# Patient Record
Sex: FEMALE | Race: WHITE | NOT HISPANIC OR LATINO | Employment: PART TIME | URBAN - METROPOLITAN AREA
[De-identification: names, ages, dates, MRNs, and addresses within clinical notes are randomized per-mention and may not be internally consistent; named-entity substitution may affect disease eponyms.]

---

## 2017-05-25 ENCOUNTER — ALLSCRIPTS OFFICE VISIT (OUTPATIENT)
Dept: OTHER | Facility: OTHER | Age: 32
End: 2017-05-25

## 2017-05-25 ENCOUNTER — HOSPITAL ENCOUNTER (EMERGENCY)
Facility: HOSPITAL | Age: 32
Discharge: HOME | End: 2017-05-25
Admitting: EMERGENCY MEDICINE
Payer: COMMERCIAL

## 2017-05-25 ENCOUNTER — APPOINTMENT (EMERGENCY)
Dept: RADIOLOGY | Facility: HOSPITAL | Age: 32
End: 2017-05-25
Payer: COMMERCIAL

## 2017-05-25 VITALS
RESPIRATION RATE: 20 BRPM | HEART RATE: 77 BPM | OXYGEN SATURATION: 99 % | HEIGHT: 64 IN | SYSTOLIC BLOOD PRESSURE: 162 MMHG | BODY MASS INDEX: 34.66 KG/M2 | WEIGHT: 203 LBS | TEMPERATURE: 98.4 F | DIASTOLIC BLOOD PRESSURE: 86 MMHG

## 2017-05-25 DIAGNOSIS — M54.9 DORSALGIA: ICD-10-CM

## 2017-05-25 DIAGNOSIS — M62.830 MUSCLE SPASM OF BACK: Primary | ICD-10-CM

## 2017-05-25 LAB
ALBUMIN SERPL BCP-MCNC: 3.7 G/DL (ref 3.5–5)
ALP SERPL-CCNC: 68 U/L (ref 46–116)
ALT SERPL W P-5'-P-CCNC: 24 U/L (ref 12–78)
ANION GAP SERPL CALCULATED.3IONS-SCNC: 11 MMOL/L (ref 4–13)
APTT PPP: 26 SECONDS (ref 24–33)
AST SERPL W P-5'-P-CCNC: 5 U/L (ref 5–45)
BASOPHILS # BLD AUTO: 0 THOUSANDS/ΜL (ref 0–0.1)
BASOPHILS NFR BLD AUTO: 0 % (ref 0–1)
BILIRUB SERPL-MCNC: 0.2 MG/DL (ref 0.2–1)
BILIRUB UR QL STRIP: NORMAL
BUN SERPL-MCNC: 8 MG/DL (ref 5–25)
CALCIUM SERPL-MCNC: 8.8 MG/DL (ref 8.3–10.1)
CHLORIDE SERPL-SCNC: 105 MMOL/L (ref 100–108)
CLARITY UR: NORMAL
CLARITY, POC: CLEAR
CO2 SERPL-SCNC: 26 MMOL/L (ref 21–32)
COLOR UR: YELLOW
COLOR, POC: YELLOW
CREAT SERPL-MCNC: 0.77 MG/DL (ref 0.6–1.3)
EOSINOPHIL # BLD AUTO: 0.3 THOUSAND/ΜL (ref 0–0.61)
EOSINOPHIL NFR BLD AUTO: 3 % (ref 0–6)
ERYTHROCYTE [DISTWIDTH] IN BLOOD BY AUTOMATED COUNT: 15.4 % (ref 11.6–15.1)
EXT BLOOD URINE: NORMAL
EXT GLUCOSE, UA: NORMAL
EXT KETONES: NORMAL
EXT NITRITE, UA: NORMAL
EXT PH, UA: 6.5
EXT PROTEIN, UA: NORMAL
GFR SERPL CREATININE-BSD FRML MDRD: >60 ML/MIN/1.73SQ M
GLUCOSE (HISTORICAL): NORMAL
GLUCOSE SERPL-MCNC: 125 MG/DL (ref 65–140)
HCG UR QL: NORMAL
HCT VFR BLD AUTO: 40.7 % (ref 37–47)
HGB BLD-MCNC: 13.1 G/DL (ref 12–16)
HGB UR QL STRIP.AUTO: 250
INR PPP: 0.96 (ref 0.86–1.16)
KETONES UR STRIP-MCNC: NORMAL MG/DL
LEUKOCYTE ESTERASE UR QL STRIP: NORMAL
LYMPHOCYTES # BLD AUTO: 1.8 THOUSANDS/ΜL (ref 0.6–4.47)
LYMPHOCYTES NFR BLD AUTO: 19 % (ref 14–44)
MCH RBC QN AUTO: 28 PG (ref 27–31)
MCHC RBC AUTO-ENTMCNC: 32.3 G/DL (ref 31.4–37.4)
MCV RBC AUTO: 87 FL (ref 82–98)
MONOCYTES # BLD AUTO: 0.5 THOUSAND/ΜL (ref 0.17–1.22)
MONOCYTES NFR BLD AUTO: 6 % (ref 4–12)
NEUTROPHILS # BLD AUTO: 7 THOUSANDS/ΜL (ref 1.85–7.62)
NEUTS SEG NFR BLD AUTO: 72 % (ref 43–75)
NITRITE UR QL STRIP: NORMAL
NRBC BLD AUTO-RTO: 0 /100 WBCS
PH UR STRIP.AUTO: 7 [PH]
PLATELET # BLD AUTO: 345 THOUSANDS/UL (ref 130–400)
PMV BLD AUTO: 8.2 FL (ref 8.9–12.7)
POTASSIUM SERPL-SCNC: 3.3 MMOL/L (ref 3.5–5.3)
PROT SERPL-MCNC: 7.2 G/DL (ref 6.4–8.2)
PROT UR STRIP-MCNC: NORMAL MG/DL
PROTHROMBIN TIME: 10.1 SECONDS (ref 9.4–11.7)
RBC # BLD AUTO: 4.7 MILLION/UL (ref 4.2–5.4)
SODIUM SERPL-SCNC: 142 MMOL/L (ref 136–145)
SP GR UR STRIP.AUTO: 1.01
UROBILINOGEN UR QL STRIP.AUTO: 0
WBC # BLD AUTO: 9.7 THOUSAND/UL (ref 4.8–10.8)
WBC # BLD EST: NORMAL 10*3/UL

## 2017-05-25 PROCEDURE — 80053 COMPREHEN METABOLIC PANEL: CPT | Performed by: PHYSICIAN ASSISTANT

## 2017-05-25 PROCEDURE — 85610 PROTHROMBIN TIME: CPT | Performed by: PHYSICIAN ASSISTANT

## 2017-05-25 PROCEDURE — 81025 URINE PREGNANCY TEST: CPT | Performed by: PHYSICIAN ASSISTANT

## 2017-05-25 PROCEDURE — 96374 THER/PROPH/DIAG INJ IV PUSH: CPT

## 2017-05-25 PROCEDURE — 74176 CT ABD & PELVIS W/O CONTRAST: CPT

## 2017-05-25 PROCEDURE — 85730 THROMBOPLASTIN TIME PARTIAL: CPT | Performed by: PHYSICIAN ASSISTANT

## 2017-05-25 PROCEDURE — 99284 EMERGENCY DEPT VISIT MOD MDM: CPT

## 2017-05-25 PROCEDURE — 96375 TX/PRO/DX INJ NEW DRUG ADDON: CPT

## 2017-05-25 PROCEDURE — 36415 COLL VENOUS BLD VENIPUNCTURE: CPT | Performed by: PHYSICIAN ASSISTANT

## 2017-05-25 PROCEDURE — 85025 COMPLETE CBC W/AUTO DIFF WBC: CPT | Performed by: PHYSICIAN ASSISTANT

## 2017-05-25 PROCEDURE — 81002 URINALYSIS NONAUTO W/O SCOPE: CPT | Performed by: PHYSICIAN ASSISTANT

## 2017-05-25 PROCEDURE — 96361 HYDRATE IV INFUSION ADD-ON: CPT

## 2017-05-25 RX ORDER — MORPHINE SULFATE 4 MG/ML
4 INJECTION, SOLUTION INTRAMUSCULAR; INTRAVENOUS ONCE
Status: COMPLETED | OUTPATIENT
Start: 2017-05-25 | End: 2017-05-25

## 2017-05-25 RX ORDER — NAPROXEN 500 MG/1
500 TABLET ORAL 2 TIMES DAILY WITH MEALS
Qty: 20 TABLET | Refills: 0 | Status: SHIPPED | OUTPATIENT
Start: 2017-05-25 | End: 2018-02-04

## 2017-05-25 RX ORDER — CYCLOBENZAPRINE HCL 10 MG
10 TABLET ORAL 3 TIMES DAILY PRN
Qty: 12 TABLET | Refills: 0 | Status: SHIPPED | OUTPATIENT
Start: 2017-05-25 | End: 2018-02-04

## 2017-05-25 RX ORDER — ONDANSETRON 2 MG/ML
4 INJECTION INTRAMUSCULAR; INTRAVENOUS ONCE
Status: COMPLETED | OUTPATIENT
Start: 2017-05-25 | End: 2017-05-25

## 2017-05-25 RX ADMIN — ONDANSETRON 4 MG: 2 INJECTION INTRAMUSCULAR; INTRAVENOUS at 17:02

## 2017-05-25 RX ADMIN — MORPHINE SULFATE 4 MG: 4 INJECTION, SOLUTION INTRAMUSCULAR; INTRAVENOUS at 17:04

## 2017-05-25 RX ADMIN — SODIUM CHLORIDE 1000 ML: 0.9 INJECTION, SOLUTION INTRAVENOUS at 16:58

## 2017-05-26 ENCOUNTER — GENERIC CONVERSION - ENCOUNTER (OUTPATIENT)
Dept: OTHER | Facility: OTHER | Age: 32
End: 2017-05-26

## 2017-05-27 LAB
BACTERIA UR QL AUTO: ABNORMAL
BILIRUB UR QL STRIP: NEGATIVE
COLOR UR: YELLOW
COMMENT (HISTORICAL): CLEAR
CRYSTAL TYPE (HISTORICAL): ABNORMAL
CRYSTALS URNS QL MICRO: PRESENT
FECAL OCCULT BLOOD DIAGNOSTIC (HISTORICAL): NEGATIVE
GLUCOSE (HISTORICAL): NEGATIVE
KETONES UR STRIP-MCNC: NEGATIVE MG/DL
LEUKOCYTE ESTERASE UR QL STRIP: NEGATIVE
MICROSCOPIC EXAMINATION (HISTORICAL): ABNORMAL
MICROSCOPIC EXAMINATION (HISTORICAL): ABNORMAL
MUCUS THREADS (HISTORICAL): PRESENT
NITRITE UR QL STRIP: NEGATIVE
NON-SQ EPI CELLS URNS QL MICRO: ABNORMAL /HPF
PH UR STRIP.AUTO: 6.5 [PH] (ref 5–7.5)
PROT UR STRIP-MCNC: NEGATIVE MG/DL
RBC (HISTORICAL): ABNORMAL /HPF
SP GR UR STRIP.AUTO: 1.02 (ref 1–1.03)
UROBILINOGEN UR QL STRIP.AUTO: 0.2 EU/DL (ref 0.2–1)
WBC # BLD AUTO: ABNORMAL /HPF

## 2017-05-28 LAB
CULTURE RESULT (HISTORICAL): NORMAL
MISCELLANEOUS LAB TEST RESULT (HISTORICAL): NORMAL

## 2017-06-06 ENCOUNTER — ALLSCRIPTS OFFICE VISIT (OUTPATIENT)
Dept: OTHER | Facility: OTHER | Age: 32
End: 2017-06-06

## 2017-09-11 ENCOUNTER — GENERIC CONVERSION - ENCOUNTER (OUTPATIENT)
Dept: OTHER | Facility: OTHER | Age: 32
End: 2017-09-11

## 2017-09-11 DIAGNOSIS — N63.0 BREAST LUMP: ICD-10-CM

## 2017-09-18 ENCOUNTER — HOSPITAL ENCOUNTER (OUTPATIENT)
Dept: RADIOLOGY | Facility: HOSPITAL | Age: 32
Discharge: HOME | End: 2017-09-18
Payer: COMMERCIAL

## 2017-09-18 DIAGNOSIS — N63.0 BREAST LUMP: ICD-10-CM

## 2017-09-18 PROCEDURE — G0204 DX MAMMO INCL CAD BI: HCPCS

## 2017-09-18 PROCEDURE — 76642 ULTRASOUND BREAST LIMITED: CPT

## 2017-09-19 ENCOUNTER — GENERIC CONVERSION - ENCOUNTER (OUTPATIENT)
Dept: OTHER | Facility: OTHER | Age: 32
End: 2017-09-19

## 2018-01-13 VITALS
TEMPERATURE: 97.2 F | WEIGHT: 202 LBS | HEART RATE: 76 BPM | HEIGHT: 64 IN | RESPIRATION RATE: 16 BRPM | DIASTOLIC BLOOD PRESSURE: 80 MMHG | SYSTOLIC BLOOD PRESSURE: 152 MMHG | OXYGEN SATURATION: 98 % | BODY MASS INDEX: 34.49 KG/M2

## 2018-01-13 VITALS
RESPIRATION RATE: 18 BRPM | TEMPERATURE: 97.8 F | OXYGEN SATURATION: 98 % | HEART RATE: 88 BPM | DIASTOLIC BLOOD PRESSURE: 80 MMHG | BODY MASS INDEX: 35.02 KG/M2 | WEIGHT: 204 LBS | SYSTOLIC BLOOD PRESSURE: 138 MMHG

## 2018-01-13 NOTE — MISCELLANEOUS
Chief Complaint  Chief Complaint Free Text Note Form: Phone call to patient 8/23/16 at 4:39 pm  No answer, message left to call back to office  Follow up appointment scheduled 8/31/16 at 7 pm with Dr Allen Moyer RN      Active Problems    1  Abnormal weight gain (783 1) (R63 5)   2  BMI 36 0-36 9,adult (V85 36) (Z68 36)   3  Encounter for cervical Pap smear with pelvic exam (V76 2,V72 31) (Z01 419)   4  Encounter for gynecological examination without abnormal finding (V72 31) (Z01 419)   5  Encounter for screening for cardiovascular disorders (V81 2) (Z13 6)   6  Kidney stones (592 0) (N20 0)   7  Screen for STD (sexually transmitted disease) (V74 5) (Z11 3)   8  Skin tags, multiple acquired (701 9) (L91 8)    Past Medical History    1  History of kidney stones (V13 01) (R80 364)    Surgical History    1  History of Cholecystectomy    Family History  Mother    1  No pertinent family history    Social History    · Former smoker (A02 39) (V68 652)   · Social drinker    Allergies    1  No Known Drug Allergies    2  No Known Environmental Allergies   3  No Known Food Allergies   4   No Known Latex Allergies    Message   Recorded as Task   Date: 08/20/2016 04:03 PM, Created By: System   Task Name: Michel Del Angel   Assigned To: Isabella Gardner   Regarding Patient: Jason Bruno, Status: Active   Comment:    System - 20 Aug 2016 4:03 PM     Patient discharged from hospital   Patient Name: Todd Roblero  Patient YOB: 1985  Discharge Date: 8/20/2016  Facility: [facility] - 22 Aug 2016 8:00 AM     TASK EDITED     Signatures   Electronically signed by : WILLIAMS Florence ; Oct  3 2016  3:10PM EST                       (Author)    Electronically signed by : KIET De Paz ; Oct  3 2016  3:20PM EST                       (Author)

## 2018-01-18 NOTE — MISCELLANEOUS
Message  tried to reach pt multiple times by phone number in chart  no VM avail for message  sent letter us mail/certified   scanned into chart      Signatures   Electronically signed by : ARIELLA Anthony; Sep 19 2017  2:02PM EST                       (Author)

## 2018-01-22 VITALS
HEART RATE: 68 BPM | OXYGEN SATURATION: 98 % | HEIGHT: 64 IN | SYSTOLIC BLOOD PRESSURE: 122 MMHG | BODY MASS INDEX: 36.02 KG/M2 | RESPIRATION RATE: 16 BRPM | WEIGHT: 211 LBS | DIASTOLIC BLOOD PRESSURE: 72 MMHG | TEMPERATURE: 97.1 F

## 2018-02-04 ENCOUNTER — APPOINTMENT (EMERGENCY)
Dept: RADIOLOGY | Facility: HOSPITAL | Age: 33
End: 2018-02-04
Payer: COMMERCIAL

## 2018-02-04 ENCOUNTER — HOSPITAL ENCOUNTER (EMERGENCY)
Facility: HOSPITAL | Age: 33
Discharge: HOME | End: 2018-02-04
Attending: EMERGENCY MEDICINE | Admitting: EMERGENCY MEDICINE
Payer: COMMERCIAL

## 2018-02-04 VITALS
RESPIRATION RATE: 20 BRPM | TEMPERATURE: 97.7 F | HEART RATE: 83 BPM | OXYGEN SATURATION: 97 % | DIASTOLIC BLOOD PRESSURE: 62 MMHG | WEIGHT: 200 LBS | HEIGHT: 64 IN | SYSTOLIC BLOOD PRESSURE: 125 MMHG | BODY MASS INDEX: 34.15 KG/M2

## 2018-02-04 DIAGNOSIS — R10.9 FLANK PAIN: Primary | ICD-10-CM

## 2018-02-04 LAB
ANION GAP SERPL CALCULATED.3IONS-SCNC: 8 MMOL/L (ref 4–13)
BASOPHILS # BLD AUTO: 0 THOUSANDS/ΜL (ref 0–0.1)
BASOPHILS NFR BLD AUTO: 0 % (ref 0–1)
BILIRUB UR QL STRIP: NEGATIVE
BUN SERPL-MCNC: 11 MG/DL (ref 5–25)
CALCIUM SERPL-MCNC: 9.1 MG/DL (ref 8.3–10.1)
CHLORIDE SERPL-SCNC: 105 MMOL/L (ref 100–108)
CLARITY UR: CLEAR
CO2 SERPL-SCNC: 30 MMOL/L (ref 21–32)
COLOR UR: YELLOW
CREAT SERPL-MCNC: 0.68 MG/DL (ref 0.6–1.3)
EOSINOPHIL # BLD AUTO: 0.4 THOUSAND/ΜL (ref 0–0.61)
EOSINOPHIL NFR BLD AUTO: 4 % (ref 0–6)
ERYTHROCYTE [DISTWIDTH] IN BLOOD BY AUTOMATED COUNT: 15 % (ref 11.6–15.1)
EXT PREG TEST URINE: NEGATIVE
GFR SERPL CREATININE-BSD FRML MDRD: 114 ML/MIN/1.73SQ M
GLUCOSE SERPL-MCNC: 127 MG/DL (ref 65–140)
GLUCOSE UR STRIP-MCNC: NEGATIVE MG/DL
HCT VFR BLD AUTO: 41.1 % (ref 37–47)
HGB BLD-MCNC: 13.5 G/DL (ref 12–16)
HGB UR QL STRIP.AUTO: NEGATIVE
KETONES UR STRIP-MCNC: NEGATIVE MG/DL
LEUKOCYTE ESTERASE UR QL STRIP: NEGATIVE
LYMPHOCYTES # BLD AUTO: 2.2 THOUSANDS/ΜL (ref 0.6–4.47)
LYMPHOCYTES NFR BLD AUTO: 23 % (ref 14–44)
MCH RBC QN AUTO: 29.3 PG (ref 27–31)
MCHC RBC AUTO-ENTMCNC: 32.9 G/DL (ref 31.4–37.4)
MCV RBC AUTO: 89 FL (ref 82–98)
MONOCYTES # BLD AUTO: 0.7 THOUSAND/ΜL (ref 0.17–1.22)
MONOCYTES NFR BLD AUTO: 7 % (ref 4–12)
NEUTROPHILS # BLD AUTO: 6.2 THOUSANDS/ΜL (ref 1.85–7.62)
NEUTS SEG NFR BLD AUTO: 65 % (ref 43–75)
NITRITE UR QL STRIP: NEGATIVE
NRBC BLD AUTO-RTO: 0 /100 WBCS
PH UR STRIP.AUTO: 6.5 [PH] (ref 5–9)
PLATELET # BLD AUTO: 318 THOUSANDS/UL (ref 130–400)
PMV BLD AUTO: 8.3 FL (ref 8.9–12.7)
POTASSIUM SERPL-SCNC: 3.3 MMOL/L (ref 3.5–5.3)
PROT UR STRIP-MCNC: NEGATIVE MG/DL
RBC # BLD AUTO: 4.62 MILLION/UL (ref 4.2–5.4)
SODIUM SERPL-SCNC: 143 MMOL/L (ref 136–145)
SP GR UR STRIP.AUTO: 1.02 (ref 1–1.03)
UROBILINOGEN UR QL STRIP.AUTO: 0.2 E.U./DL
WBC # BLD AUTO: 9.6 THOUSAND/UL (ref 4.8–10.8)

## 2018-02-04 PROCEDURE — 36415 COLL VENOUS BLD VENIPUNCTURE: CPT | Performed by: EMERGENCY MEDICINE

## 2018-02-04 PROCEDURE — 96361 HYDRATE IV INFUSION ADD-ON: CPT

## 2018-02-04 PROCEDURE — 99284 EMERGENCY DEPT VISIT MOD MDM: CPT

## 2018-02-04 PROCEDURE — 81003 URINALYSIS AUTO W/O SCOPE: CPT | Performed by: EMERGENCY MEDICINE

## 2018-02-04 PROCEDURE — 96374 THER/PROPH/DIAG INJ IV PUSH: CPT

## 2018-02-04 PROCEDURE — 96375 TX/PRO/DX INJ NEW DRUG ADDON: CPT

## 2018-02-04 PROCEDURE — 74176 CT ABD & PELVIS W/O CONTRAST: CPT

## 2018-02-04 PROCEDURE — 85025 COMPLETE CBC W/AUTO DIFF WBC: CPT | Performed by: EMERGENCY MEDICINE

## 2018-02-04 PROCEDURE — 80048 BASIC METABOLIC PNL TOTAL CA: CPT | Performed by: EMERGENCY MEDICINE

## 2018-02-04 PROCEDURE — 81025 URINE PREGNANCY TEST: CPT | Performed by: EMERGENCY MEDICINE

## 2018-02-04 RX ORDER — KETOROLAC TROMETHAMINE 30 MG/ML
30 INJECTION, SOLUTION INTRAMUSCULAR; INTRAVENOUS ONCE
Status: COMPLETED | OUTPATIENT
Start: 2018-02-04 | End: 2018-02-04

## 2018-02-04 RX ORDER — MORPHINE SULFATE 4 MG/ML
4 INJECTION, SOLUTION INTRAMUSCULAR; INTRAVENOUS ONCE
Status: COMPLETED | OUTPATIENT
Start: 2018-02-04 | End: 2018-02-04

## 2018-02-04 RX ORDER — ONDANSETRON 2 MG/ML
4 INJECTION INTRAMUSCULAR; INTRAVENOUS ONCE
Status: COMPLETED | OUTPATIENT
Start: 2018-02-04 | End: 2018-02-04

## 2018-02-04 RX ADMIN — SODIUM CHLORIDE 1000 ML: 0.9 INJECTION, SOLUTION INTRAVENOUS at 22:00

## 2018-02-04 RX ADMIN — MORPHINE SULFATE 4 MG: 4 INJECTION, SOLUTION INTRAMUSCULAR; INTRAVENOUS at 22:30

## 2018-02-04 RX ADMIN — KETOROLAC TROMETHAMINE 30 MG: 30 INJECTION, SOLUTION INTRAMUSCULAR at 22:01

## 2018-02-04 RX ADMIN — ONDANSETRON 4 MG: 2 INJECTION INTRAMUSCULAR; INTRAVENOUS at 22:02

## 2018-02-05 NOTE — DISCHARGE INSTRUCTIONS
Renal Colic   WHAT YOU NEED TO KNOW:   Renal colic is severe pain in your lower back or sides  The pain is usually on one side, but may be on both sides of your lower back  Renal colic may start quickly, come and go, and become worse over time  Renal colic is caused by a blockage in your urinary tract  The most common cause of a blockage is a kidney stone  Blood clots, ureter spasms, and dead tissue may also block your urinary tract  DISCHARGE INSTRUCTIONS:   Return to the emergency department if:   · You cannot stop vomiting  · You see new or increased bleeding when you urinate  · You are urinating less than usual, or not at all  · Your pain is not getting better even after treatment  Contact your healthcare provider if:   · You have fever  · You need to urinate more often than usual, or right away  · You see a stone in your urine strainer after you urinate  · You have questions or concerns about your condition or care  Medicines:   · Medicines  may help decrease pain and muscle spasms  You may also need medicine to calm your stomach and stop vomiting  · Take your medicine as directed  Contact your healthcare provider if you think your medicine is not helping or if you have side effects  Tell him of her if you are allergic to any medicine  Keep a list of the medicines, vitamins, and herbs you take  Include the amounts, and when and why you take them  Bring the list or the pill bottles to follow-up visits  Carry your medicine list with you in case of an emergency  Manage your symptoms:   · Drink liquids as directed  to help decrease pain and flush blockages from your urinary tract  Ask how much liquid to drink each day and which liquids are best for you  You may need to drink about 3 liters (12 glasses) of liquids each day  Half of your total daily liquids should be water  Limit coffee, tea, and soda to 2 cups daily  Your urine should be pale and clear      · Strain your urine every time you urinate  Urinate into a strainer (funnel with a fine mesh on the bottom) or glass jar to collect kidney stones  Give the kidney stones to your healthcare provider at your next visit  · Eat a variety of healthy foods  Healthy foods include fruits, vegetables, whole-grain breads, low-fat dairy products, beans, lean meats, and fish  You may need to increase the amount of citrus fruit you eat, such as oranges  Ask your healthcare provider how much salt, calcium, and protein you should eat  · Avoid activity in hot temperatures  Heat may cause you to become dehydrated and urinate less  Follow up with your healthcare provider as directed: You may need to return for tests to check if your blockage has cleared  Write down your questions so you remember to ask them during your visits  © 2017 [employer] Information is for End User's use only and may not be sold, redistributed or otherwise used for commercial purposes  All illustrations and images included in CareNotes® are the copyrighted property of [employer]  or [employer]ckenna  The above information is an  only  It is not intended as medical advice for individual conditions or treatments  Talk to your doctor, nurse or pharmacist before following any medical regimen to see if it is safe and effective for you

## 2018-02-05 NOTE — ED PROVIDER NOTES
History  Chief Complaint   Patient presents with    Flank Pain     States pain right flank for about 1 week  Hx of kidney stones  Had stent removed 1 year ago   HPI  26 yo female with hx of renal colic, stents presents c/o right flank pain time one week  Pt states pain startdon right and has gotten progressively worse  Pt c/o nasuea and vomiting , no urinary symoptms  No hematuria   Pt denies any chest apin or shornetss of breath   Vitals:    18 2149   BP: 125/62   Pulse: 83   Resp: 20   Temp: 97 7 °F (36 5 °C)   SpO2: 97%     Breast Cancer-related family history is not on file  Social History     Social History    Marital status: Significant Other     Spouse name: N/A    Number of children: N/A    Years of education: N/A     Occupational History    Not on file  Social History Main Topics    Smoking status: Former Smoker     Packs/day: 0 25     Years: 15 00     Quit date:     Smokeless tobacco: Never Used    Alcohol use Yes      Comment: rarely    Drug use: No    Sexual activity: Yes     Partners: Male     Other Topics Concern    Not on file     Social History Narrative    No narrative on file     Past Medical History:   Diagnosis Date    Gallstones     Hypertension     With pregnancy    Kidney stones        None       Past Medical History:   Diagnosis Date    Gallstones     Hypertension     With pregnancy    Kidney stones        Past Surgical History:   Procedure Laterality Date     SECTION       &     CHOLECYSTECTOMY  2013    lap    EXTRACORPOREAL SHOCK WAVE LITHOTRIPSY Right 12/10/2010    15mm stone    EXTRACORPOREAL SHOCK WAVE LITHOTRIPSY Right 2011    5mm stone  Did not have f/u x-ray after [facility]  Sent reminder and slip  Negligent patient      INDUCED   2006    LA CYSTO/URETERO W/LITHOTRIPSY &INDWELL STENT INSRT Right 2016    Procedure: CYSTOSCOPY, RETROGRADE PYELOGRAM,URETEROSCOPY WITH HOLMIUM LASER LITHOTRIPSY,STONE BASKET EXTRACTION,  AND INSERTION URETERAL STENT;  Surgeon: Aurea Peterson MD;  Location: [address] [place] [address];  Service: Urology    [place] CYSTOURETHROSCOPY,URETER CATHETER Right 8/13/2016    Procedure: CYSTOSCOPY RETROGRADE PYELOGRAM WITH INSERTION STENT URETERAL;  Surgeon: Aurea Peterson MD;  Location: [address] [place] [address];  Service: Urology    TUBAL LIGATION  2010    URETERAL STENT PLACEMENT Right 06/30/2010    Pregnant    URETERAL STENT PLACEMENT Right 09/17/2010    Replaced still pregnant    URETERAL STENT PLACEMENT Right 11/05/2010    Still Pregnant       Family History   Problem Relation Age of Onset    Cancer Mother     Kidney disease Mother     Cancer Maternal Grandmother     Diabetes Maternal Grandmother     Kidney disease Maternal Grandmother      I have reviewed and agree with the history as documented  Social History   Substance Use Topics    Smoking status: Former Smoker     Packs/day: 0 25     Years: 15 00     Quit date: 2015    Smokeless tobacco: Never Used    Alcohol use Yes      Comment: rarely        Review of Systems   Constitutional: Negative  HENT: Negative  Eyes: Negative  Respiratory: Negative  Cardiovascular: Negative  Gastrointestinal: Negative  Endocrine: Negative  Genitourinary:        Right cva tenderness to palpation      Musculoskeletal: Negative  Skin: Negative  Allergic/Immunologic: Negative  Neurological: Negative  Hematological: Negative  Psychiatric/Behavioral: Negative  All other systems reviewed and are negative        Physical Exam  ED Triage Vitals [02/04/18 2149]   Temperature Pulse Respirations Blood Pressure SpO2   97 7 °F (36 5 °C) 83 20 125/62 97 %      Temp Source Heart Rate Source Patient Position - Orthostatic VS BP Location FiO2 (%)   Oral Monitor Sitting Left arm --      Pain Score       9           Orthostatic Vital Signs  Vitals:    02/04/18 2149   BP: 125/62   Pulse: 83   Patient Position - Orthostatic VS: Sitting       Physical Exam    ED Medications  Medications   sodium chloride 0 9 % bolus 1,000 mL (1,000 mL Intravenous New Bag 2/4/18 2200)   ketorolac (TORADOL) injection 30 mg (30 mg Intravenous Given 2/4/18 2201)   ondansetron (ZOFRAN) injection 4 mg (4 mg Intravenous Given 2/4/18 2202)       Diagnostic Studies  Results Reviewed     Procedure Component Value Units Date/Time    CBC and differential [[id number]] Collected:  02/04/18 2203    Lab Status: In process Specimen:  Blood from Arm, Right Updated:  02/04/18 5909    Basic metabolic panel [[id number]] Collected:  02/04/18 2203    Lab Status: In process Specimen:  Blood from Arm, Right Updated:  02/04/18 2208    UA w Reflex to Microscopic w Reflex to Culture [[id number]]     Lab Status:  No result Specimen:  Urine     POCT pregnancy, urine [[id number]]     Lab Status:  No result                  CT renal stone study abdomen pelvis wo contrast    (Results Pending)              Procedures  Procedures       Phone Contacts  ED Phone Contact    ED Course  ED Course          A/P 28 yo female with hx of renal colic, pt c/o increasing right flank pain - r/o renal stone, r/o hydronephrosis   -labs  -ct abdomen/pelvis renal   -ivf  -toradol          11:26 PM  Ct - no evidence of significant acute process   nonobstruction right renl ston , no evidenc eo obstructive uropatyh   11:28 PM  Pt pending urinalysis                   [facility]  CritCare Time    Disposition  Final diagnoses:   None     ED Disposition     None      Follow-up Information    None       Patient's Medications   Discharge Prescriptions    No medications on file     No discharge procedures on file      ED Provider  Electronically Signed by           Kassie Frausto MD  02/04/18 0001       Kassie Frausto MD  02/04/18 7831       Kassie Frausto MD  02/04/18 5150

## 2018-08-24 ENCOUNTER — APPOINTMENT (EMERGENCY)
Dept: RADIOLOGY | Facility: HOSPITAL | Age: 33
End: 2018-08-24
Payer: COMMERCIAL

## 2018-08-24 ENCOUNTER — HOSPITAL ENCOUNTER (EMERGENCY)
Facility: HOSPITAL | Age: 33
Discharge: HOME | End: 2018-08-25
Attending: EMERGENCY MEDICINE | Admitting: EMERGENCY MEDICINE
Payer: COMMERCIAL

## 2018-08-24 VITALS
TEMPERATURE: 98.5 F | RESPIRATION RATE: 18 BRPM | WEIGHT: 200 LBS | OXYGEN SATURATION: 97 % | BODY MASS INDEX: 34.06 KG/M2 | DIASTOLIC BLOOD PRESSURE: 88 MMHG | HEART RATE: 83 BPM | SYSTOLIC BLOOD PRESSURE: 150 MMHG

## 2018-08-24 DIAGNOSIS — R07.9 CHEST PAIN: Primary | ICD-10-CM

## 2018-08-24 LAB
ALBUMIN SERPL BCP-MCNC: 3.7 G/DL (ref 3.5–5)
ALP SERPL-CCNC: 61 U/L (ref 46–116)
ALT SERPL W P-5'-P-CCNC: 37 U/L (ref 12–78)
ANION GAP SERPL CALCULATED.3IONS-SCNC: 6 MMOL/L (ref 4–13)
APTT PPP: 25 SECONDS (ref 24–36)
AST SERPL W P-5'-P-CCNC: 12 U/L (ref 5–45)
BASOPHILS # BLD AUTO: 0.03 THOUSANDS/ΜL (ref 0–0.1)
BASOPHILS NFR BLD AUTO: 0 % (ref 0–1)
BILIRUB SERPL-MCNC: 0.3 MG/DL (ref 0.2–1)
BUN SERPL-MCNC: 9 MG/DL (ref 5–25)
CALCIUM SERPL-MCNC: 8.5 MG/DL (ref 8.3–10.1)
CHLORIDE SERPL-SCNC: 104 MMOL/L (ref 100–108)
CO2 SERPL-SCNC: 30 MMOL/L (ref 21–32)
CREAT SERPL-MCNC: 0.87 MG/DL (ref 0.6–1.3)
DEPRECATED D DIMER PPP: 360 NG/ML (FEU) (ref 190–520)
EOSINOPHIL # BLD AUTO: 0.16 THOUSAND/ΜL (ref 0–0.61)
EOSINOPHIL NFR BLD AUTO: 2 % (ref 0–6)
ERYTHROCYTE [DISTWIDTH] IN BLOOD BY AUTOMATED COUNT: 14.1 % (ref 11.6–15.1)
GFR SERPL CREATININE-BSD FRML MDRD: 87 ML/MIN/1.73SQ M
GLUCOSE SERPL-MCNC: 120 MG/DL (ref 65–140)
HCT VFR BLD AUTO: 37 % (ref 34.8–46.1)
HGB BLD-MCNC: 11.7 G/DL (ref 11.5–15.4)
IMM GRANULOCYTES # BLD AUTO: 0.02 THOUSAND/UL (ref 0–0.2)
IMM GRANULOCYTES NFR BLD AUTO: 0 % (ref 0–2)
INR PPP: 1 (ref 0.86–1.16)
LYMPHOCYTES # BLD AUTO: 1.92 THOUSANDS/ΜL (ref 0.6–4.47)
LYMPHOCYTES NFR BLD AUTO: 19 % (ref 14–44)
MCH RBC QN AUTO: 27.3 PG (ref 26.8–34.3)
MCHC RBC AUTO-ENTMCNC: 31.6 G/DL (ref 31.4–37.4)
MCV RBC AUTO: 86 FL (ref 82–98)
MONOCYTES # BLD AUTO: 0.66 THOUSAND/ΜL (ref 0.17–1.22)
MONOCYTES NFR BLD AUTO: 7 % (ref 4–12)
NEUTROPHILS # BLD AUTO: 7.22 THOUSANDS/ΜL (ref 1.85–7.62)
NEUTS SEG NFR BLD AUTO: 72 % (ref 43–75)
NRBC BLD AUTO-RTO: 0 /100 WBCS
PLATELET # BLD AUTO: 375 THOUSANDS/UL (ref 149–390)
PMV BLD AUTO: 9.7 FL (ref 8.9–12.7)
POTASSIUM SERPL-SCNC: 3.1 MMOL/L (ref 3.5–5.3)
PROT SERPL-MCNC: 7.2 G/DL (ref 6.4–8.2)
PROTHROMBIN TIME: 10.5 SECONDS (ref 9.4–11.7)
RBC # BLD AUTO: 4.29 MILLION/UL (ref 3.81–5.12)
SODIUM SERPL-SCNC: 140 MMOL/L (ref 136–145)
TROPONIN I SERPL-MCNC: <0.02 NG/ML
WBC # BLD AUTO: 10.01 THOUSAND/UL (ref 4.31–10.16)

## 2018-08-24 PROCEDURE — 93005 ELECTROCARDIOGRAM TRACING: CPT

## 2018-08-24 PROCEDURE — 85025 COMPLETE CBC W/AUTO DIFF WBC: CPT | Performed by: EMERGENCY MEDICINE

## 2018-08-24 PROCEDURE — 84484 ASSAY OF TROPONIN QUANT: CPT | Performed by: EMERGENCY MEDICINE

## 2018-08-24 PROCEDURE — 36415 COLL VENOUS BLD VENIPUNCTURE: CPT | Performed by: EMERGENCY MEDICINE

## 2018-08-24 PROCEDURE — 71045 X-RAY EXAM CHEST 1 VIEW: CPT

## 2018-08-24 PROCEDURE — 96374 THER/PROPH/DIAG INJ IV PUSH: CPT

## 2018-08-24 PROCEDURE — 85730 THROMBOPLASTIN TIME PARTIAL: CPT | Performed by: EMERGENCY MEDICINE

## 2018-08-24 PROCEDURE — 85610 PROTHROMBIN TIME: CPT | Performed by: EMERGENCY MEDICINE

## 2018-08-24 PROCEDURE — 85379 FIBRIN DEGRADATION QUANT: CPT | Performed by: EMERGENCY MEDICINE

## 2018-08-24 PROCEDURE — 80053 COMPREHEN METABOLIC PANEL: CPT | Performed by: EMERGENCY MEDICINE

## 2018-08-24 RX ORDER — KETOROLAC TROMETHAMINE 30 MG/ML
15 INJECTION, SOLUTION INTRAMUSCULAR; INTRAVENOUS ONCE
Status: COMPLETED | OUTPATIENT
Start: 2018-08-24 | End: 2018-08-24

## 2018-08-24 RX ADMIN — KETOROLAC TROMETHAMINE 15 MG: 30 INJECTION, SOLUTION INTRAMUSCULAR at 23:33

## 2018-08-25 LAB
ATRIAL RATE: 81 BPM
P AXIS: 50 DEGREES
PR INTERVAL: 168 MS
QRS AXIS: 117 DEGREES
QRSD INTERVAL: 102 MS
QT INTERVAL: 366 MS
QTC INTERVAL: 425 MS
T WAVE AXIS: 5 DEGREES
VENTRICULAR RATE: 81 BPM

## 2018-08-25 PROCEDURE — 93010 ELECTROCARDIOGRAM REPORT: CPT | Performed by: INTERNAL MEDICINE

## 2018-08-25 PROCEDURE — 99285 EMERGENCY DEPT VISIT HI MDM: CPT

## 2018-08-25 NOTE — ED PROCEDURE NOTE
PROCEDURE  ECG 12 Lead Documentation  Date/Time: 8/24/2018 10:14 PM  Performed by: Nikos Richardson  Authorized by: Nikos Richardson     ECG reviewed by me, the ED Provider: yes    Patient location:  ED  Interpretation:     Interpretation: abnormal    Rate:     ECG rate:  81    ECG rate assessment: normal    Rhythm:     Rhythm: sinus rhythm    Ectopy:     Ectopy: none    QRS:     QRS axis:  Right  Conduction:     Conduction: normal    ST segments:     ST segments:  Normal  T waves:     T waves: normal           Darrel Gonsalves DO  08/24/18 4851

## 2018-08-25 NOTE — DISCHARGE INSTRUCTIONS
Chest Pain   WHAT YOU NEED TO KNOW:   Chest pain can be caused by a range of conditions, from not serious to life-threatening  Chest pain can be a symptom of a digestive problem, such as acid reflux or a stomach ulcer  An anxiety attack or a strong emotion, such as anger, can also cause chest pain  Infection, inflammation, or a fracture in the bones or cartilage in your chest can cause pain or discomfort  Sometimes chest pain or pressure is caused by poor blood flow to your heart (angina)  Chest pain may also be caused by life-threatening conditions such as a heart attack or blood clot in your lungs  DISCHARGE INSTRUCTIONS:   Call 911 if:   · You have any of the following signs of a heart attack:      ¨ Squeezing, pressure, or pain in your chest that lasts longer than 5 minutes or returns    ¨ Discomfort or pain in your back, neck, jaw, stomach, or arm     ¨ Trouble breathing    ¨ Nausea or vomiting    ¨ Lightheadedness or a sudden cold sweat, especially with chest pain or trouble breathing    Seek care immediately if:   · You have chest discomfort that gets worse, even with medicine  · You cough or vomit blood  · Your bowel movements are black or bloody  · You cannot stop vomiting, or it hurts to swallow  Contact your healthcare provider if:   · You have questions or concerns about your condition or care  Medicines:   · Medicines  may be given to treat the cause of your chest pain  Examples include pain medicine, anxiety medicine, or medicines to increase blood flow to your heart  · Do not take certain medicines without asking your healthcare provider first   These include NSAIDs, herbal or vitamin supplements, or hormones (estrogen or progestin)  · Take your medicine as directed  Contact your healthcare provider if you think your medicine is not helping or if you have side effects  Tell him or her if you are allergic to any medicine   Keep a list of the medicines, vitamins, and herbs you take  Include the amounts, and when and why you take them  Bring the list or the pill bottles to follow-up visits  Carry your medicine list with you in case of an emergency  Follow up with your healthcare provider within 72 hours, or as directed: You may need to return for more tests to find the cause of your chest pain  You may be referred to a specialist, such as a cardiologist or gastroenterologist  Write down your questions so you remember to ask them during your visits  Healthy living tips: The following are general healthy guidelines  If your chest pain is caused by a heart problem, your healthcare provider will give you specific guidelines to follow  · Do not smoke  Nicotine and other chemicals in cigarettes and cigars can cause lung and heart damage  Ask your healthcare provider for information if you currently smoke and need help to quit  E-cigarettes or smokeless tobacco still contain nicotine  Talk to your healthcare provider before you use these products  · Eat a variety of healthy, low-fat foods  Healthy foods include fruits, vegetables, whole-grain breads, low-fat dairy products, beans, lean meats, and fish  Ask for more information about a heart healthy diet  · Ask about activity  Your healthcare provider will tell you which activities to limit or avoid  Ask when you can drive, return to work, and have sex  Ask about the best exercise plan for you  · Maintain a healthy weight  Ask your healthcare provider how much you should weigh  Ask him or her to help you create a weight loss plan if you are overweight  © 2017 [employer] Information is for End User's use only and may not be sold, redistributed or otherwise used for commercial purposes  All illustrations and images included in CareNotes® are the copyrighted property of [employer]  or [employer] M[employer]  The above information is an  only   It is not intended as medical advice for individual conditions or treatments  Talk to your doctor, nurse or pharmacist before following any medical regimen to see if it is safe and effective for you

## 2018-08-26 NOTE — ED PROVIDER NOTES
History  Chief Complaint   Patient presents with    Chest Pain     States pain chest, upper back for 3 days, pain is continuous and has tingling left arm  States headache and diarrhea for 2 days  Patient presents for evaluation of chest pain radiating to back and intermitent tingling in the left arm for 2 days  History provided by:  Patient   used: No    Chest Pain   Associated symptoms: back pain and numbness        None       Past Medical History:   Diagnosis Date    Gallstones     Hypertension     With pregnancy    Kidney stones        Past Surgical History:   Procedure Laterality Date     SECTION       &     CHOLECYSTECTOMY      lap    EXTRACORPOREAL SHOCK WAVE LITHOTRIPSY Right 12/10/2010    15mm stone    EXTRACORPOREAL SHOCK WAVE LITHOTRIPSY Right 2011    5mm stone  Did not have f/u x-ray after [facility]  Sent reminder and slip  Negligent patient      INDUCED       [place] CYSTO/URETERO W/LITHOTRIPSY &INDWELL STENT INSRT Right 2016    Procedure: CYSTOSCOPY, RETROGRADE PYELOGRAM,URETEROSCOPY WITH HOLMIUM LASER LITHOTRIPSY,STONE BASKET EXTRACTION,  AND INSERTION URETERAL STENT;  Surgeon: Dimitrios Reardon MD;  Location: [address] [place] [address];  Service: Urology    [place] CYSTOURETHROSCOPY,URETER CATHETER Right 2016    Procedure: CYSTOSCOPY RETROGRADE PYELOGRAM WITH INSERTION STENT URETERAL;  Surgeon: Dimitrios Reardon MD;  Location: [address] [place] [address];  Service: Urology    TUBAL LIGATION      URETERAL STENT PLACEMENT Right 2010    Pregnant    URETERAL STENT PLACEMENT Right 2010    Replaced still pregnant    URETERAL STENT PLACEMENT Right 2010    Still Pregnant       Family History   Problem Relation Age of Onset    Cancer Mother     Kidney disease Mother     Cancer Maternal Grandmother     Diabetes Maternal Grandmother     Kidney disease Maternal Grandmother      I have reviewed and agree with the history as documented  Social History   Substance Use Topics    Smoking status: Former Smoker     Packs/day: 0 25     Years: 15 00     Quit date: 2015    Smokeless tobacco: Never Used    Alcohol use Yes      Comment: rarely        Review of Systems   Cardiovascular: Positive for chest pain  Gastrointestinal: Positive for diarrhea  Musculoskeletal: Positive for back pain  Neurological: Positive for numbness  All other systems reviewed and are negative  Physical Exam  Physical Exam   Constitutional: She is oriented to person, place, and time  No distress  HENT:   Mouth/Throat: Oropharynx is clear and moist    Eyes: Pupils are equal, round, and reactive to light  Neck: Normal range of motion  Cardiovascular: Normal rate, regular rhythm and intact distal pulses  Pulmonary/Chest: Effort normal and breath sounds normal  No respiratory distress  Abdominal: Soft  There is no tenderness  Musculoskeletal: Normal range of motion  Neurological: She is alert and oriented to person, place, and time  Skin: Capillary refill takes less than 2 seconds  She is not diaphoretic  Nursing note and vitals reviewed        Vital Signs  ED Triage Vitals [08/24/18 2206]   Temperature Pulse Respirations Blood Pressure SpO2   98 5 °F (36 9 °C) 77 18 153/94 98 %      Temp Source Heart Rate Source Patient Position - Orthostatic VS BP Location FiO2 (%)   Oral Monitor Lying Right arm --      Pain Score       5           Vitals:    08/24/18 2206 08/24/18 2245 08/24/18 2333   BP: 153/94  150/88   Pulse: 77 70 83   Patient Position - Orthostatic VS: Lying  Lying       Visual Acuity      ED Medications  Medications   ketorolac (TORADOL) injection 15 mg (15 mg Intravenous Given 8/24/18 2333)       Diagnostic Studies  Results Reviewed     Procedure Component Value Units Date/Time    Protime-INR [[id number]]  (Normal) Collected:  08/24/18 2217    Lab Status:  Final result Specimen:  Blood from Arm, Right Updated:  08/24/18 4514 Protime 10 5 seconds      INR 1 00    APTT [[id number]]  (Normal) Collected:  08/24/18 2217    Lab Status:  Final result Specimen:  Blood from Arm, Right Updated:  08/24/18 2249     PTT 25 seconds     D-Dimer [[id number]]  (Normal) Collected:  08/24/18 2217    Lab Status:  Final result Specimen:  Blood from Arm, Right Updated:  08/24/18 2249     D-Dimer, Quant 360 ng/ml (FEU)     Troponin I [[id number]]  (Normal) Collected:  08/24/18 2217    Lab Status:  Final result Specimen:  Blood from Arm, Right Updated:  08/24/18 2241     Troponin I <0 02 ng/mL     Comprehensive metabolic panel [[id number]]  (Abnormal) Collected:  08/24/18 2217    Lab Status:  Final result Specimen:  Blood from Arm, Right Updated:  08/24/18 2239     Sodium 140 mmol/L      Potassium 3 1 (L) mmol/L      Chloride 104 mmol/L      CO2 30 mmol/L      Anion Gap 6 mmol/L      BUN 9 mg/dL      Creatinine 0 87 mg/dL      Glucose 120 mg/dL      Calcium 8 5 mg/dL      AST 12 U/L      ALT 37 U/L      Alkaline Phosphatase 61 U/L      Total Protein 7 2 g/dL      Albumin 3 7 g/dL      Total Bilirubin 0 30 mg/dL      eGFR 87 ml/min/1 73sq m     Narrative:         National Kidney Disease Education Program recommendations are as follows:  GFR calculation is accurate only with a steady state creatinine  Chronic Kidney disease less than 60 ml/min/1 73 sq  meters  Kidney failure less than 15 ml/min/1 73 sq  meters      CBC and differential [[id number]] Collected:  08/24/18 2217    Lab Status:  Final result Specimen:  Blood from Arm, Right Updated:  08/24/18 2222     WBC 10 01 Thousand/uL      RBC 4 29 Million/uL      Hemoglobin 11 7 g/dL      Hematocrit 37 0 %      MCV 86 fL      MCH 27 3 pg      MCHC 31 6 g/dL      RDW 14 1 %      MPV 9 7 fL      Platelets 797 Thousands/uL      nRBC 0 /100 WBCs      Neutrophils Relative 72 %      Immat GRANS % 0 %      Lymphocytes Relative 19 %      Monocytes Relative 7 %      Eosinophils Relative 2 %      Basophils Relative 0 % Neutrophils Absolute 7 22 Thousands/µL      Immature Grans Absolute 0 02 Thousand/uL      Lymphocytes Absolute 1 92 Thousands/µL      Monocytes Absolute 0 66 Thousand/µL      Eosinophils Absolute 0 16 Thousand/µL      Basophils Absolute 0 03 Thousands/µL                  XR chest 1 view portable   Final Result by Martha Newberry DO (08/25 0005)      No acute cardiopulmonary disease is seen            Workstation performed: [id number]                    Procedures  Procedures       Phone Contacts  ED Phone Contact    ED Course         HEART Risk Score      Most Recent Value   History  0 Filed at: 08/24/2018 2359   ECG  0 Filed at: 08/24/2018 2359   Age  0 Filed at: 08/24/2018 2359   Risk Factors  1 Filed at: 08/24/2018 2359   Troponin  0 Filed at: 08/24/2018 2359   Heart Score Risk Calculator   History  0 Filed at: 08/24/2018 2359   ECG  0 Filed at: 08/24/2018 2359   Age  0 Filed at: 08/24/2018 2359   Risk Factors  1 Filed at: 08/24/2018 2359   Troponin  0 Filed at: 08/24/2018 2359   HEART Score  1 Filed at: 08/24/2018 2359   HEART Score  1 Filed at: 08/24/2018 2359                            MDM  Number of Diagnoses or Management Options  Chest pain:   Diagnosis management comments: Pulse ox 97% on RA indicating adequate oxygenation  CXR: NAD as read by me       Amount and/or Complexity of Data Reviewed  Clinical lab tests: ordered and reviewed  Tests in the radiology section of CPT®: ordered and reviewed  Decide to obtain previous medical records or to obtain history from someone other than the patient: yes  Review and summarize past medical records: yes  Independent visualization of images, tracings, or specimens: yes    Patient Progress  Patient progress: improved    CritCare Time    Disposition  Final diagnoses:   Chest pain     Time reflects when diagnosis was documented in both MDM as applicable and the Disposition within this note     Time User Action Codes Description Comment    8/24/2018 11:59 PM Jay 106 Sabrina Delong [R07 9] Chest pain       ED Disposition     ED Disposition Condition Comment    Discharge  Ancil Fears discharge to home/self care  Condition at discharge: stable        Follow-up Information     Follow up With Specialties Details Why Contact Info Additional Information    Chandana Marshall MD Family Medicine In 3 days  [address] [facility] [id number] [facility],[address] [facility] Emergency [facility]  If symptoms worsen [address] [facility]  [phone] [facility], [place], [place], [facility]          There are no discharge medications for this patient  No discharge procedures on file      ED Provider  Electronically Signed by           Terry Mcfarland DO  08/26/18 8383

## 2018-08-29 ENCOUNTER — VBI (OUTPATIENT)
Dept: FAMILY MEDICINE CLINIC | Facility: CLINIC | Age: 33
End: 2018-08-29

## 2018-08-29 NOTE — TELEPHONE ENCOUNTER
Pt was seen in [address] on 8/24/18  CC: Chest Pain  DX: Chest pain  vm not set up yet   Letter sent

## 2018-11-27 ENCOUNTER — OFFICE VISIT (OUTPATIENT)
Dept: FAMILY MEDICINE CLINIC | Facility: CLINIC | Age: 33
End: 2018-11-27
Payer: COMMERCIAL

## 2018-11-27 VITALS
DIASTOLIC BLOOD PRESSURE: 84 MMHG | OXYGEN SATURATION: 98 % | SYSTOLIC BLOOD PRESSURE: 154 MMHG | WEIGHT: 200 LBS | HEART RATE: 73 BPM | BODY MASS INDEX: 34.06 KG/M2 | RESPIRATION RATE: 18 BRPM

## 2018-11-27 DIAGNOSIS — F32.1 MODERATE MAJOR DEPRESSION (HCC): ICD-10-CM

## 2018-11-27 DIAGNOSIS — G89.29 CHRONIC ELBOW PAIN, RIGHT: Primary | ICD-10-CM

## 2018-11-27 DIAGNOSIS — M25.521 CHRONIC ELBOW PAIN, RIGHT: Primary | ICD-10-CM

## 2018-11-27 PROCEDURE — 3725F SCREEN DEPRESSION PERFORMED: CPT | Performed by: FAMILY MEDICINE

## 2018-11-27 PROCEDURE — 99213 OFFICE O/P EST LOW 20 MIN: CPT | Performed by: FAMILY MEDICINE

## 2018-11-28 ENCOUNTER — TELEPHONE (OUTPATIENT)
Dept: FAMILY MEDICINE CLINIC | Facility: CLINIC | Age: 33
End: 2018-11-28

## 2018-11-28 ENCOUNTER — APPOINTMENT (OUTPATIENT)
Dept: RADIOLOGY | Facility: CLINIC | Age: 33
End: 2018-11-28
Payer: COMMERCIAL

## 2018-11-28 DIAGNOSIS — M25.521 CHRONIC ELBOW PAIN, RIGHT: ICD-10-CM

## 2018-11-28 DIAGNOSIS — G89.29 CHRONIC ELBOW PAIN, RIGHT: ICD-10-CM

## 2018-11-28 PROCEDURE — 73080 X-RAY EXAM OF ELBOW: CPT

## 2018-11-28 NOTE — PROGRESS NOTES
Assessment/Plan:    Diagnoses and all orders for this visit:    #1: Chronic elbow pain, right  Likely secondary to Lateral Epicondylitis vs  Bursitis vs  Muscle Strain; PE: tenderness at level of lateral epicondyle with extension over brachioradialis and olecranon; Advised RICE Therapy; Pain Control with Tylenol prn; Will order XR Elbow and refer to Orthopedic Surgery    #2: Moderate major depression ([address] )  Will have patient be seen by behavioral therapist at [facility] prior to initiating medication    PHQ-9 Depression Screening    PHQ-9:    Frequency of the following problems over the past two weeks:       Little interest or pleasure in doing things:  1 - several days  Feeling down, depressed, or hopeless:  1 - several days  Trouble falling or staying asleep, or sleeping too much:  3 - nearly every day  Feeling tired or having little energy:  3 - nearly every day  Poor appetite or overeatin - several days  Feeling bad about yourself - or that you are a failure or have let yourself or your family down:  2 - more than half the days  Trouble concentrating on things, such as reading the newspaper or watching television:  1 - several days  Moving or speaking so slowly that other people could have noticed  Or the opposite - being so fidgety or restless that you have been moving around a lot more than usual:  1 - several days  Thoughts that you would be better off dead, or of hurting yourself in some way:  0 - not at all  PHQ-2 Score:  2  PHQ-9 Score:  13       #3: BMI 35 0-35 9,adult  Counseled on Diet/Lifestyle Modification       Subjective:      Patient ID: Orestes Romero is a 26 y o  female  HPI  26year old female presents to clinic with a chief complaint of elbow pain  Located at right elbow  Started one month ago  States pain is localized to right elbow  Pain is constant and sharp  Nonradiating in nature   Denies any associated symptoms such as weakness, numbness, change in sensation, or tingling of the cervical neck, shoulder, arm, and wrist/hand  Pain made better at rest and when held to her side  Pain worse with heavy lifting  Pain rated 7-8/10 in severity  Denies an specific trauma to affected area  Notes she is a   States dominant hand is her left hand  Additional Concerns: Depression; Patient notes decreased sleep, lack of focus, irritability, and decreased appetite; States that feels that she thinks she is not doing things right  Has been going on for 6 months  Notes a family history of depression (Grandmother and Mother)  The following portions of the patient's history were reviewed and updated as appropriate: allergies, current medications, past family history, past medical history, past social history, past surgical history and problem list     Review of Systems   Constitutional: Negative for chills, diaphoresis, fatigue and fever  Eyes: Negative for visual disturbance  Respiratory: Negative for shortness of breath  Cardiovascular: Negative for chest pain, palpitations and leg swelling  Gastrointestinal: Negative for abdominal pain, constipation, diarrhea, nausea and vomiting  Genitourinary: Negative for difficulty urinating  Musculoskeletal: Positive for arthralgias  Negative for back pain, gait problem, joint swelling, myalgias, neck pain and neck stiffness  Elbow Pain   Skin: Negative for pallor and rash  Objective:    /84   Pulse 73   Resp 18   Wt 90 7 kg (200 lb)   SpO2 98%   BMI 34 06 kg/m²      Physical Exam   Constitutional: She appears well-developed and well-nourished  No distress  Cardiovascular: Normal rate, regular rhythm, normal heart sounds and intact distal pulses  Pulmonary/Chest: Effort normal and breath sounds normal  No respiratory distress  She has no wheezes  She has no rales  She exhibits no tenderness  Abdominal: Soft  Bowel sounds are normal  She exhibits no distension and no mass  There is no tenderness   There is no rebound and no guarding  Musculoskeletal:        Right shoulder: Normal         Right elbow: She exhibits normal range of motion, no swelling and no effusion  Tenderness found  Lateral epicondyle and olecranon process tenderness noted  No radial head and no medial epicondyle tenderness noted  Right wrist: Normal         Right upper arm: Normal         Right forearm: She exhibits tenderness and bony tenderness  She exhibits no swelling, no edema, no deformity and no laceration  Right hand: Normal  Normal sensation noted  Normal strength noted  Skin: She is not diaphoretic  Nursing note and vitals reviewed

## 2018-11-30 ENCOUNTER — OFFICE VISIT (OUTPATIENT)
Dept: OBGYN CLINIC | Facility: CLINIC | Age: 33
End: 2018-11-30
Payer: COMMERCIAL

## 2018-11-30 VITALS
WEIGHT: 212.8 LBS | HEART RATE: 93 BPM | DIASTOLIC BLOOD PRESSURE: 90 MMHG | BODY MASS INDEX: 36.33 KG/M2 | HEIGHT: 64 IN | SYSTOLIC BLOOD PRESSURE: 146 MMHG

## 2018-11-30 DIAGNOSIS — G89.29 CHRONIC ELBOW PAIN, RIGHT: ICD-10-CM

## 2018-11-30 DIAGNOSIS — M25.521 PAIN IN RIGHT ELBOW: Primary | ICD-10-CM

## 2018-11-30 DIAGNOSIS — M25.521 CHRONIC ELBOW PAIN, RIGHT: ICD-10-CM

## 2018-11-30 DIAGNOSIS — M77.11 EPICONDYLITIS, LATERAL, RIGHT: ICD-10-CM

## 2018-11-30 PROCEDURE — 20605 DRAIN/INJ JOINT/BURSA W/O US: CPT | Performed by: ORTHOPAEDIC SURGERY

## 2018-11-30 PROCEDURE — 99243 OFF/OP CNSLTJ NEW/EST LOW 30: CPT | Performed by: ORTHOPAEDIC SURGERY

## 2018-11-30 RX ADMIN — TRIAMCINOLONE ACETONIDE 20 MG: 40 INJECTION, SUSPENSION INTRA-ARTICULAR; INTRAMUSCULAR at 16:34

## 2018-11-30 NOTE — PROGRESS NOTES
Assessment/Plan:  1  Pain in right elbow  Medium joint arthrocentesis   2  Chronic elbow pain, right  Ambulatory referral to Orthopedic Surgery    Medium joint arthrocentesis   3  Epicondylitis, lateral, right       Patient has symptoms consistent with lateral epicondylitis  Options were discussed for treatment  Patient wished to undergo steroid injection well as bracing  Patient will do exercises at home  She was offered physical therapy however she declined  She will follow up in 2 months for re-evaluation  She understood the plan  Subjective:  Right elbow pain    Patient ID: Rachell Wells is a 26 y o  female  HPI  Joel Miller is a 25-year-old female complaining of right elbow pain which has been going on for about a month  She has not recall what started the pain  She has tried Advil and other anti-inflammatories which did not help much  She claims to get the pain at work with as a   He denies any numbness or tingling  Review of Systems   Constitutional: Negative for chills, fever and unexpected weight change  HENT: Negative for hearing loss, nosebleeds and sore throat  Eyes: Negative for pain, redness and visual disturbance  Respiratory: Negative for cough, shortness of breath and wheezing  Cardiovascular: Negative for chest pain, palpitations and leg swelling  Gastrointestinal: Negative for abdominal pain, nausea and vomiting  Endocrine: Negative for polydipsia and polyuria  Genitourinary: Negative for dysuria and hematuria  Musculoskeletal:        See HPI   Skin: Negative for rash and wound  Neurological: Negative for dizziness, numbness and headaches  Psychiatric/Behavioral: Negative for decreased concentration and suicidal ideas  The patient is not nervous/anxious            Past Medical History:   Diagnosis Date    Gallstones     Hypertension     With pregnancy    Kidney stones        Past Surgical History:   Procedure Laterality Date     SECTION  & 2010    CHOLECYSTECTOMY  2013    lap    EXTRACORPOREAL SHOCK WAVE LITHOTRIPSY Right 12/10/2010    15mm stone    EXTRACORPOREAL SHOCK WAVE LITHOTRIPSY Right 2011    5mm stone  Did not have f/u x-ray after ESWL  Sent reminder and slip  Negligent patient   INDUCED   2006    [place] CYSTO/URETERO W/LITHOTRIPSY &INDWELL STENT INSRT Right 2016    Procedure: CYSTOSCOPY, RETROGRADE PYELOGRAM,URETEROSCOPY WITH HOLMIUM LASER LITHOTRIPSY,STONE BASKET EXTRACTION,  AND INSERTION URETERAL STENT;  Surgeon: Geovanni Sol MD;  Location: [address] [place] [address];  Service: Urology    [place] CYSTOURETHROSCOPY,URETER CATHETER Right 2016    Procedure: CYSTOSCOPY RETROGRADE PYELOGRAM WITH INSERTION STENT URETERAL;  Surgeon: Geovanni Sol MD;  Location: [address] [place] [address];  Service: Urology    TUBAL LIGATION      URETERAL STENT PLACEMENT Right 2010    Pregnant    URETERAL STENT PLACEMENT Right 2010    Replaced still pregnant    URETERAL STENT PLACEMENT Right 2010    Still Pregnant       Family History   Problem Relation Age of Onset    Cancer Mother     Kidney disease Mother     Cancer Maternal Grandmother     Diabetes Maternal Grandmother     Kidney disease Maternal Grandmother        Social History     Occupational History    Not on file  Social History Main Topics    Smoking status: Former Smoker     Packs/day: 0 25     Years: 15 00     Quit date:     Smokeless tobacco: Never Used    Alcohol use Yes      Comment: rarely    Drug use: No    Sexual activity: Yes     Partners: Male       No current outpatient prescriptions on file  No Known Allergies    Objective:  Vitals:    18 1459   BP: 146/90   Pulse: 93       Body mass index is 36 53 kg/m²      Ortho Exam    Right elbow  Positive tender lateral epicondyle  Positive pain with resisted wrist extension  No pain with resisted long finger extension  Nontender about the triceps  Full range of motion right elbow pronation supination flexion-extension  Brisk capillary refill  Compartment soft      Physical Exam   Constitutional: She is oriented to person, place, and time  She appears well-developed and well-nourished  HENT:   Head: Normocephalic and atraumatic  Eyes: Conjunctivae are normal    Neck: Neck supple  Cardiovascular: Intact distal pulses  Pulmonary/Chest: Effort normal    Neurological: She is alert and oriented to person, place, and time  Skin: Skin is warm and dry  Psychiatric: She has a normal mood and affect  Her behavior is normal    Vitals reviewed  I have personally reviewed pertinent films in PACS  No fracture dislocation  No lesions found of the right elbow films      Medium joint arthrocentesis  Date/Time: 11/30/2018 4:34 PM  Consent given by: patient  Site marked: site marked  Supporting Documentation  Indications: pain   Procedure Details  Location: elbow - R elbow  Needle size: 27 G  Ultrasound guidance: no  Approach: anterolateral  Medications administered: 20 mg triamcinolone acetonide 40 mg/mL    Patient tolerance: patient tolerated the procedure well with no immediate complications  Dressing:  Sterile dressing applied

## 2018-12-13 RX ORDER — TRIAMCINOLONE ACETONIDE 40 MG/ML
20 INJECTION, SUSPENSION INTRA-ARTICULAR; INTRAMUSCULAR
Status: COMPLETED | OUTPATIENT
Start: 2018-11-30 | End: 2018-11-30

## 2018-12-13 NOTE — PROCEDURES
Patient is scheduled to go home from [facility] s/p fem/pop.  He was informed by the surgeon that he will be sent home with a weeks worth of pain medication and if he requires more medication after that he will need to contact his primary MD.  Advised him that the surgeon should have an idea as to how long he should require narcotics after this type of surgery and typically the surgeon should be prescribing that if it is truly necessary. Patient is asking that if he requires more should he come in to see you for this. Recommended he talk to the surgeon again regarding this. He would like a call back with your thoughts on this.   Procedures      Please see note for procedure

## 2019-05-21 ENCOUNTER — HOSPITAL ENCOUNTER (EMERGENCY)
Facility: HOSPITAL | Age: 34
Discharge: HOME | End: 2019-05-21
Attending: EMERGENCY MEDICINE | Admitting: EMERGENCY MEDICINE
Payer: COMMERCIAL

## 2019-05-21 ENCOUNTER — TELEPHONE (OUTPATIENT)
Dept: FAMILY MEDICINE CLINIC | Facility: CLINIC | Age: 34
End: 2019-05-21

## 2019-05-21 VITALS
HEIGHT: 66 IN | HEART RATE: 75 BPM | RESPIRATION RATE: 18 BRPM | BODY MASS INDEX: 33.75 KG/M2 | OXYGEN SATURATION: 98 % | TEMPERATURE: 98 F | WEIGHT: 210 LBS | SYSTOLIC BLOOD PRESSURE: 138 MMHG | DIASTOLIC BLOOD PRESSURE: 75 MMHG

## 2019-05-21 DIAGNOSIS — N93.8 DYSFUNCTIONAL UTERINE BLEEDING: Primary | ICD-10-CM

## 2019-05-21 DIAGNOSIS — D64.9 ANEMIA: ICD-10-CM

## 2019-05-21 LAB
BASOPHILS # BLD AUTO: 0.02 THOUSANDS/ΜL (ref 0–0.1)
BASOPHILS NFR BLD AUTO: 0 % (ref 0–1)
EOSINOPHIL # BLD AUTO: 0.13 THOUSAND/ΜL (ref 0–0.61)
EOSINOPHIL NFR BLD AUTO: 2 % (ref 0–6)
ERYTHROCYTE [DISTWIDTH] IN BLOOD BY AUTOMATED COUNT: 16.6 % (ref 11.6–15.1)
EXT PREG TEST URINE: NEGATIVE
HCT VFR BLD AUTO: 32.2 % (ref 34.8–46.1)
HGB BLD-MCNC: 9.7 G/DL (ref 11.5–15.4)
IMM GRANULOCYTES # BLD AUTO: 0.03 THOUSAND/UL (ref 0–0.2)
IMM GRANULOCYTES NFR BLD AUTO: 0 % (ref 0–2)
LYMPHOCYTES # BLD AUTO: 1.37 THOUSANDS/ΜL (ref 0.6–4.47)
LYMPHOCYTES NFR BLD AUTO: 17 % (ref 14–44)
MCH RBC QN AUTO: 23.4 PG (ref 26.8–34.3)
MCHC RBC AUTO-ENTMCNC: 30.1 G/DL (ref 31.4–37.4)
MCV RBC AUTO: 78 FL (ref 82–98)
MONOCYTES # BLD AUTO: 0.66 THOUSAND/ΜL (ref 0.17–1.22)
MONOCYTES NFR BLD AUTO: 8 % (ref 4–12)
NEUTROPHILS # BLD AUTO: 5.85 THOUSANDS/ΜL (ref 1.85–7.62)
NEUTS SEG NFR BLD AUTO: 73 % (ref 43–75)
NRBC BLD AUTO-RTO: 0 /100 WBCS
PLATELET # BLD AUTO: 345 THOUSANDS/UL (ref 149–390)
PMV BLD AUTO: 9.6 FL (ref 8.9–12.7)
RBC # BLD AUTO: 4.15 MILLION/UL (ref 3.81–5.12)
WBC # BLD AUTO: 8.06 THOUSAND/UL (ref 4.31–10.16)

## 2019-05-21 PROCEDURE — 81025 URINE PREGNANCY TEST: CPT | Performed by: EMERGENCY MEDICINE

## 2019-05-21 PROCEDURE — 99284 EMERGENCY DEPT VISIT MOD MDM: CPT

## 2019-05-21 PROCEDURE — 36415 COLL VENOUS BLD VENIPUNCTURE: CPT | Performed by: EMERGENCY MEDICINE

## 2019-05-21 PROCEDURE — 85025 COMPLETE CBC W/AUTO DIFF WBC: CPT | Performed by: EMERGENCY MEDICINE

## 2019-05-21 RX ORDER — DOCUSATE SODIUM 100 MG/1
100 CAPSULE, LIQUID FILLED ORAL EVERY 12 HOURS
Qty: 60 CAPSULE | Refills: 0 | Status: SHIPPED | OUTPATIENT
Start: 2019-05-21 | End: 2019-08-22

## 2019-05-21 RX ORDER — FERROUS SULFATE 325(65) MG
325 TABLET ORAL 2 TIMES DAILY WITH MEALS
Qty: 60 TABLET | Refills: 0 | Status: SHIPPED | OUTPATIENT
Start: 2019-05-21 | End: 2019-08-22

## 2019-05-23 ENCOUNTER — OFFICE VISIT (OUTPATIENT)
Dept: FAMILY MEDICINE CLINIC | Facility: CLINIC | Age: 34
End: 2019-05-23
Payer: COMMERCIAL

## 2019-05-23 DIAGNOSIS — N92.6 IRREGULAR MENSES: Primary | ICD-10-CM

## 2019-05-23 DIAGNOSIS — N92.1 MENORRHAGIA WITH IRREGULAR CYCLE: ICD-10-CM

## 2019-05-23 PROCEDURE — 99213 OFFICE O/P EST LOW 20 MIN: CPT | Performed by: FAMILY MEDICINE

## 2019-05-28 VITALS
OXYGEN SATURATION: 99 % | TEMPERATURE: 97 F | DIASTOLIC BLOOD PRESSURE: 62 MMHG | BODY MASS INDEX: 33.41 KG/M2 | WEIGHT: 207 LBS | HEART RATE: 68 BPM | RESPIRATION RATE: 20 BRPM | SYSTOLIC BLOOD PRESSURE: 114 MMHG

## 2019-08-22 ENCOUNTER — HOSPITAL ENCOUNTER (EMERGENCY)
Facility: HOSPITAL | Age: 34
Discharge: HOME | End: 2019-08-23
Attending: EMERGENCY MEDICINE | Admitting: EMERGENCY MEDICINE
Payer: COMMERCIAL

## 2019-08-22 ENCOUNTER — APPOINTMENT (EMERGENCY)
Dept: RADIOLOGY | Facility: HOSPITAL | Age: 34
End: 2019-08-22
Payer: COMMERCIAL

## 2019-08-22 DIAGNOSIS — R10.9 FLANK PAIN: Primary | ICD-10-CM

## 2019-08-22 DIAGNOSIS — N20.0 NEPHROLITHIASIS: ICD-10-CM

## 2019-08-22 LAB
BACTERIA UR QL AUTO: ABNORMAL /HPF
BILIRUB UR QL STRIP: NEGATIVE
CLARITY UR: CLEAR
COLOR UR: ABNORMAL
EXT PREG TEST URINE: NEGATIVE
EXT. CONTROL ED NAV: NORMAL
GLUCOSE UR STRIP-MCNC: NEGATIVE MG/DL
HGB UR QL STRIP.AUTO: NEGATIVE
KETONES UR STRIP-MCNC: NEGATIVE MG/DL
LEUKOCYTE ESTERASE UR QL STRIP: NEGATIVE
MUCOUS THREADS UR QL AUTO: ABNORMAL
NITRITE UR QL STRIP: NEGATIVE
NON-SQ EPI CELLS URNS QL MICRO: ABNORMAL /HPF
PH UR STRIP.AUTO: 6.5 [PH]
PROT UR STRIP-MCNC: NEGATIVE MG/DL
RBC #/AREA URNS AUTO: ABNORMAL /HPF
SP GR UR STRIP.AUTO: 1.01 (ref 1–1.03)
UROBILINOGEN UR QL STRIP.AUTO: 0.2 E.U./DL
WBC #/AREA URNS AUTO: ABNORMAL /HPF

## 2019-08-22 PROCEDURE — 99284 EMERGENCY DEPT VISIT MOD MDM: CPT

## 2019-08-22 PROCEDURE — 74176 CT ABD & PELVIS W/O CONTRAST: CPT

## 2019-08-22 PROCEDURE — 81025 URINE PREGNANCY TEST: CPT | Performed by: EMERGENCY MEDICINE

## 2019-08-22 PROCEDURE — 81001 URINALYSIS AUTO W/SCOPE: CPT | Performed by: EMERGENCY MEDICINE

## 2019-08-22 PROCEDURE — 96372 THER/PROPH/DIAG INJ SC/IM: CPT

## 2019-08-22 RX ORDER — PREDNISONE 20 MG/1
60 TABLET ORAL ONCE
Status: COMPLETED | OUTPATIENT
Start: 2019-08-22 | End: 2019-08-22

## 2019-08-22 RX ORDER — KETOROLAC TROMETHAMINE 30 MG/ML
30 INJECTION, SOLUTION INTRAMUSCULAR; INTRAVENOUS ONCE
Status: COMPLETED | OUTPATIENT
Start: 2019-08-22 | End: 2019-08-22

## 2019-08-22 RX ORDER — CYCLOBENZAPRINE HCL 10 MG
10 TABLET ORAL ONCE
Status: COMPLETED | OUTPATIENT
Start: 2019-08-22 | End: 2019-08-22

## 2019-08-22 RX ORDER — MORPHINE SULFATE 4 MG/ML
4 INJECTION, SOLUTION INTRAMUSCULAR; INTRAVENOUS ONCE
Status: COMPLETED | OUTPATIENT
Start: 2019-08-22 | End: 2019-08-22

## 2019-08-22 RX ADMIN — PREDNISONE 60 MG: 20 TABLET ORAL at 22:04

## 2019-08-22 RX ADMIN — KETOROLAC TROMETHAMINE 30 MG: 30 INJECTION, SOLUTION INTRAMUSCULAR at 22:04

## 2019-08-22 RX ADMIN — CYCLOBENZAPRINE HYDROCHLORIDE 10 MG: 10 TABLET, FILM COATED ORAL at 22:04

## 2019-08-22 RX ADMIN — MORPHINE SULFATE 4 MG: 4 INJECTION INTRAVENOUS at 23:11

## 2019-08-23 VITALS
WEIGHT: 205 LBS | RESPIRATION RATE: 16 BRPM | OXYGEN SATURATION: 98 % | DIASTOLIC BLOOD PRESSURE: 90 MMHG | SYSTOLIC BLOOD PRESSURE: 151 MMHG | BODY MASS INDEX: 33.09 KG/M2 | HEART RATE: 62 BPM | TEMPERATURE: 98.1 F

## 2019-08-23 RX ORDER — HYDROCODONE BITARTRATE AND ACETAMINOPHEN 5; 325 MG/1; MG/1
1 TABLET ORAL EVERY 6 HOURS PRN
Qty: 12 TABLET | Refills: 0 | Status: SHIPPED | OUTPATIENT
Start: 2019-08-23 | End: 2019-08-23

## 2019-08-23 RX ORDER — NAPROXEN 500 MG/1
500 TABLET ORAL 2 TIMES DAILY WITH MEALS
Qty: 10 TABLET | Refills: 0 | Status: SHIPPED | OUTPATIENT
Start: 2019-08-23 | End: 2020-01-22

## 2019-08-23 RX ORDER — HYDROCODONE BITARTRATE AND ACETAMINOPHEN 5; 325 MG/1; MG/1
1 TABLET ORAL EVERY 6 HOURS PRN
Qty: 12 TABLET | Refills: 0 | Status: SHIPPED | OUTPATIENT
Start: 2019-08-23 | End: 2019-08-26

## 2019-08-23 RX ORDER — NAPROXEN 500 MG/1
500 TABLET ORAL 2 TIMES DAILY WITH MEALS
Qty: 10 TABLET | Refills: 0 | Status: SHIPPED | OUTPATIENT
Start: 2019-08-23 | End: 2019-08-23

## 2019-08-23 NOTE — ED PROVIDER NOTES
History  Chief Complaint   Patient presents with    Flank Pain     pt presents to the ed with right sided flank pain x 2 days  pt states she has a hx of kidney stones and has difficulty passing stones      27 y/o female presents with right flank pain sudden onset 3 days ago radiating to her right lower abdomen and upper leg, currently 7/10, sharp continuous nothing makes it better or worse  She denies any nausea,vomiting,fevers,chills,dysuira, urgency or frequency, no diarrhea, constipation  History provided by:  Patient   used: No        None       Past Medical History:   Diagnosis Date    Gallstones     Hypertension     With pregnancy    Kidney stones        Past Surgical History:   Procedure Laterality Date     SECTION       &     CHOLECYSTECTOMY      lap    EXTRACORPOREAL SHOCK WAVE LITHOTRIPSY Right 12/10/2010    15mm stone    EXTRACORPOREAL SHOCK WAVE LITHOTRIPSY Right 2011    5mm stone  Did not have f/u x-ray after [facility]  Sent reminder and slip  Negligent patient      INDUCED       [place] CYSTO/URETERO W/LITHOTRIPSY &INDWELL STENT INSRT Right 2016    Procedure: CYSTOSCOPY, RETROGRADE PYELOGRAM,URETEROSCOPY WITH HOLMIUM LASER LITHOTRIPSY,STONE BASKET EXTRACTION,  AND INSERTION URETERAL STENT;  Surgeon: Brian Smith MD;  Location: [address] [place] [address];  Service: Urology    [place] CYSTOURETHROSCOPY,URETER CATHETER Right 2016    Procedure: CYSTOSCOPY RETROGRADE PYELOGRAM WITH INSERTION STENT URETERAL;  Surgeon: Brian Smith MD;  Location: [address] [place] [address];  Service: Urology    TUBAL LIGATION      URETERAL STENT PLACEMENT Right 2010    Pregnant    URETERAL STENT PLACEMENT Right 2010    Replaced still pregnant    URETERAL STENT PLACEMENT Right 2010    Still Pregnant       Family History   Problem Relation Age of Onset    Cancer Mother     Kidney disease Mother     Cirrhosis Mother         Hepatic    Cancer Maternal Grandmother     Diabetes Maternal Grandmother     Kidney disease Maternal Grandmother      I have reviewed and agree with the history as documented  Social History     Tobacco Use    Smoking status: Former Smoker     Packs/day: 0 25     Years: 15 00     Pack years: 3 75     Last attempt to quit: 2015     Years since quittin 6    Smokeless tobacco: Never Used   Substance Use Topics    Alcohol use: Yes     Comment: rarely; socially    Drug use: No        Review of Systems   All other systems reviewed and are negative  Physical Exam  Physical Exam   Constitutional: She is oriented to person, place, and time  She appears well-developed and well-nourished  HENT:   Head: Normocephalic and atraumatic  Eyes: Pupils are equal, round, and reactive to light  EOM are normal    Neck: Normal range of motion  Neck supple  Cardiovascular: Normal rate and regular rhythm  Pulmonary/Chest: Effort normal and breath sounds normal    Abdominal: Soft  Bowel sounds are normal    Right flank tenderness and right lumbar spine tenderness, no CVA tenderness, no abdominal tenderness   Musculoskeletal: Normal range of motion  Neurological: She is alert and oriented to person, place, and time  Skin: Skin is warm and dry  Psychiatric: She has a normal mood and affect  Nursing note and vitals reviewed        Vital Signs  ED Triage Vitals   Temperature Pulse Respirations Blood Pressure SpO2   19   98 1 °F (36 7 °C) 71 18 139/71 98 %      Temp Source Heart Rate Source Patient Position - Orthostatic VS BP Location FiO2 (%)   19 -- -- --   Tympanic Monitor         Pain Score       19       7           Vitals:    19   BP: 139/71   Pulse: 71         Visual Acuity      ED Medications  Medications   ketorolac (TORADOL) injection 30 mg (30 mg Intramuscular Given 19)   cyclobenzaprine (FLEXERIL) tablet 10 mg (10 mg Oral Given 8/22/19 2204)   predniSONE tablet 60 mg (60 mg Oral Given 8/22/19 2204)   morphine (PF) 4 mg/mL injection 4 mg (4 mg Intramuscular Given 8/22/19 2311)       Diagnostic Studies  Results Reviewed     Procedure Component Value Units Date/Time    Urinalysis with microscopic [[id number]]  (Abnormal) Collected:  08/22/19 2203    Lab Status:  Final result Specimen:  Urine, Clean Catch Updated:  08/22/19 2227     Clarity, UA Clear     Color, UA Light Yellow     Specific Gravity, UA 1 015     pH, UA 6 5     Glucose, UA Negative mg/dl      Ketones, UA Negative mg/dl      Blood, UA Negative     Protein, UA Negative mg/dl      Nitrite, UA Negative     Bilirubin, UA Negative     Urobilinogen, UA 0 2 E U /dl      Leukocytes, UA Negative     WBC, UA 4-10 /hpf      RBC, UA None Seen /hpf      Bacteria, UA Occasional /hpf      Epithelial Cells Occasional /hpf      MUCUS THREADS Occasional    POCT pregnancy, urine [[id number]]  (Normal) Resulted:  08/22/19 2215    Lab Status:  Final result Updated:  08/22/19 2215     EXT PREG TEST UR (Ref: Negative) negative     Control valid                 CT renal stone study abdomen pelvis without contrast   Final Result by Marcos Lamar MD (08/23 0028)         Nonobstructing 1 to 4 mm right renal calculi  Mild fullness of the right collecting system without obstructive uropathy  Possible recent passage of a small calculus  Workstation performed: [id number]                    Procedures  Procedures       ED Course                               MDM  Number of Diagnoses or Management Options  Diagnosis management comments: Patient evaluated with UA, imaging  I reviewed the results and discussed them with the patient  Patient discharged with appropriate instructions medications and follow-up  Patient verbalized understanding had no further questions at the time of discharge  Patient had stable vital signs and well-appearing at the time of discharge  Amount and/or Complexity of Data Reviewed  Clinical lab tests: ordered and reviewed  Tests in the radiology section of CPT®: ordered and reviewed  Tests in the medicine section of CPT®: ordered and reviewed    Patient Progress  Patient progress: stable      Disposition  Final diagnoses:   Flank pain   Nephrolithiasis     Time reflects when diagnosis was documented in both MDM as applicable and the Disposition within this note     Time User Action Codes Description Comment    8/23/2019 12:40 AM AnepuTor Add [R10 9] Flank pain     8/23/2019 12:41 AM Anepu, Carlota Add [N13 2] Hydronephrosis with renal and ureteral calculous obstruction     8/23/2019 12:41 AM Anepu, Carlota Remove [N13 2] Hydronephrosis with renal and ureteral calculous obstruction     8/23/2019 12:42 AM Anepu, Carlota Add [N20 0] Nephrolithiasis       ED Disposition     ED Disposition Condition Date/Time Comment    Discharge Stable Fri Aug 23, 2019 12:40 AM Margo German discharge to home/self care              Follow-up Information     Follow up With Specialties Details Why Contact Info Additional Information    Jose Leigh DO Family Medicine Schedule an appointment as soon as possible for a visit   [address] Emergency Department Emergency Medicine  If symptoms worsen [address] [facility] [address] [phone] [facility], [place], [place], [address]          Patient's Medications   Discharge Prescriptions    HYDROCODONE-ACETAMINOPHEN (NORCO) 5-325 MG PER TABLET    Take 1 tablet by mouth every 6 (six) hours as needed for pain for up to 3 daysMax Daily Amount: 4 tablets       Start Date: 8/23/2019 End Date: 8/26/2019       Order Dose: 1 tablet       Quantity: 12 tablet    Refills: 0    NAPROXEN (NAPROSYN) 500 MG TABLET    Take 1 tablet (500 mg total) by mouth 2 (two) times a day with meals for 5 days       Start Date: 8/23/2019 End Date: 8/28/2019       Order Dose: 500 mg       Quantity: 10 tablet    Refills: 0     No discharge procedures on file      ED Provider  Electronically Signed by           Ruth Ramos DO  08/23/19 5839

## 2019-08-23 NOTE — ED NOTES
Patient transported to [address] [place] [address], [address] [place] [address]  08/22/19 9387

## 2019-09-16 ENCOUNTER — OFFICE VISIT (OUTPATIENT)
Dept: FAMILY MEDICINE CLINIC | Facility: CLINIC | Age: 34
End: 2019-09-16
Payer: COMMERCIAL

## 2019-09-16 VITALS
BODY MASS INDEX: 36.02 KG/M2 | SYSTOLIC BLOOD PRESSURE: 124 MMHG | HEIGHT: 64 IN | DIASTOLIC BLOOD PRESSURE: 84 MMHG | OXYGEN SATURATION: 98 % | HEART RATE: 78 BPM | WEIGHT: 211 LBS | TEMPERATURE: 98.3 F | RESPIRATION RATE: 18 BRPM

## 2019-09-16 DIAGNOSIS — Z00.00 WELL ADULT EXAM: Primary | ICD-10-CM

## 2019-09-16 PROCEDURE — 99395 PREV VISIT EST AGE 18-39: CPT | Performed by: FAMILY MEDICINE

## 2019-09-16 NOTE — PROGRESS NOTES
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  Patient drinks alcohol occasionally, is not a smoker and denies illicit drug use  The following portions of the patient's history were reviewed and updated as appropriate: allergies, current medications, past family history, past medical history, past social history, past surgical history and problem list     Review of Systems   Constitutional: Negative for activity change, fatigue and fever  HENT: Negative for congestion, ear pain, sneezing and sore throat  Respiratory: Negative for cough, shortness of breath and wheezing  Cardiovascular: Negative for chest pain  Gastrointestinal: Negative for abdominal pain, constipation, diarrhea, nausea and vomiting  Skin: Negative  Neurological: Negative for dizziness and headaches  Objective:  /84 (BP Location: Right arm, Patient Position: Sitting)   Pulse 78   Temp 98 3 °F (36 8 °C) (Tympanic)   Resp 18   Ht 5' 4" (1 626 m)   Wt 95 7 kg (211 lb)   LMP 09/05/2019   SpO2 98%   BMI 36 22 kg/m²      Physical Exam   Constitutional: She is oriented to person, place, and time  She appears well-developed and well-nourished  No distress  HENT:   Head: Normocephalic and atraumatic  Right Ear: External ear normal    Left Ear: External ear normal    Mouth/Throat: No oropharyngeal exudate  Eyes: Conjunctivae are normal  Right eye exhibits no discharge   Left eye exhibits no discharge  No scleral icterus  Cardiovascular: Normal rate, regular rhythm and normal heart sounds  Exam reveals no gallop and no friction rub  No murmur heard  Pulmonary/Chest: Effort normal and breath sounds normal  No respiratory distress  She has no wheezes  She has no rales  Abdominal: Soft  Bowel sounds are normal  She exhibits no distension  There is no tenderness  There is no rebound and no guarding  Musculoskeletal: She exhibits no edema or tenderness  Neurological: She is alert and oriented to person, place, and time  Skin: Skin is warm and dry  No rash noted  She is not diaphoretic  No erythema  No pallor  Psychiatric: She has a normal mood and affect  Her behavior is normal    Vitals reviewed

## 2019-11-19 ENCOUNTER — OFFICE VISIT (OUTPATIENT)
Dept: FAMILY MEDICINE CLINIC | Facility: CLINIC | Age: 34
End: 2019-11-19
Payer: COMMERCIAL

## 2019-11-19 VITALS
HEART RATE: 95 BPM | TEMPERATURE: 98.2 F | WEIGHT: 211 LBS | SYSTOLIC BLOOD PRESSURE: 148 MMHG | RESPIRATION RATE: 18 BRPM | DIASTOLIC BLOOD PRESSURE: 84 MMHG | OXYGEN SATURATION: 99 % | BODY MASS INDEX: 36.22 KG/M2

## 2019-11-19 DIAGNOSIS — D22.9 MULTIPLE BENIGN MELANOCYTIC NEVI: ICD-10-CM

## 2019-11-19 DIAGNOSIS — Z11.59 MEASLES SCREENING: ICD-10-CM

## 2019-11-19 DIAGNOSIS — R13.14 PHARYNGOESOPHAGEAL DYSPHAGIA: Primary | ICD-10-CM

## 2019-11-19 PROBLEM — M54.9 BACK PAIN: Status: ACTIVE | Noted: 2017-05-25

## 2019-11-19 PROBLEM — N63.0 BREAST LUMP: Status: ACTIVE | Noted: 2017-09-11

## 2019-11-19 PROBLEM — N23 RENAL COLIC: Status: ACTIVE | Noted: 2017-05-25

## 2019-11-19 PROCEDURE — 1036F TOBACCO NON-USER: CPT | Performed by: FAMILY MEDICINE

## 2019-11-19 PROCEDURE — 99213 OFFICE O/P EST LOW 20 MIN: CPT | Performed by: FAMILY MEDICINE

## 2019-11-19 NOTE — PROGRESS NOTES
Assessment/Plan:     Diagnoses and all orders for this visit:    Pharyngoesophageal dysphagia  -     Ambulatory Referral to Otolaryngology; Future     I think it is important that she see an expert for this, I am concerned about a possible polyp on her distal pharynx, or potentially even just on her vocal cords nearby  I suspect she will need a laryngoscopy  Measles screening  -     Rubeola antibody IgG; Future  -     Rubella antibody, IgG; Future  -     Mumps antibody, IgG; Future   - above tests ordered will call patient with results  Will also print them if needed for her job  Multiple benign melanocytic nevi   - her concern about a small mole on her right medial breast was addressed today  It appears to be a normal estrogen caused mole or nevi  The color is the same throughout, and it has no erythema  This is a benign finding  Please call or return to clinic with any questions or concerns  Patient advised to return to clinic to further discuss her headaches  Subjective:   Patient ID: Hayden Hays is a 37 y o  female  HPI     Vida Zepeda is here today for multiple reasons the 1st of which is a small 2-3 mm mole on her right medial breast   Noticed this 2 days ago and noticed that it was raised, and slightly irregular in color (all brown)  It is asymmetric  Does not know of having this in the past   Also denies itching or bleeding from it  No history with Dermatology  No family history of skin cancers, several years ago was using tanning beds although she has stopped  She is also reporting dysphagia for years  Just feels like something in the back of her throat needs to be swallowed but does not resolved  Feels as if there is constantly a tickle in the back of her throat  Coughing use to resolve it, but no longer does  Denies acid reflux symptoms, reports this has been constant for the past several months      She also has a new job at a care facility and would like to have MMR titers done for her safety and the safety of her patients  Patient refuses the flu vaccine today  Review of Systems   Constitutional: Negative for chills and fever  HENT: Positive for trouble swallowing  Negative for congestion, rhinorrhea and sore throat  Respiratory: Negative for cough and shortness of breath  Cardiovascular: Negative for chest pain and palpitations  Skin: Positive for color change  Nevus see HPI   Neurological: Positive for headaches  Negative for dizziness  Objective:  Vitals:    11/19/19 1539   BP: 148/84   Pulse: 95   Resp: 18   Temp: 98 2 °F (36 8 °C)   SpO2: 99%      Physical Exam   Constitutional: She is oriented to person, place, and time  She appears well-developed and well-nourished  HENT:   Head: Normocephalic and atraumatic  Eyes: Right eye exhibits no discharge  Left eye exhibits no discharge  No scleral icterus  Neck: Normal range of motion  Neck supple  Cardiovascular: Normal rate, regular rhythm, normal heart sounds and intact distal pulses  Exam reveals no friction rub  No murmur heard  Pulmonary/Chest: Effort normal and breath sounds normal  No stridor  No respiratory distress  Neurological: She is alert and oriented to person, place, and time  Skin: Skin is warm  No rash noted  No erythema  No pallor  Multiple small nevi on upper extremities and chest bilaterally  The right medial breast has a 3 mm round, brown lesions/nevi  Benign-appearing  Psychiatric: She has a normal mood and affect  Thought content normal    Vitals reviewed

## 2019-11-22 PROBLEM — D22.9 MULTIPLE BENIGN MELANOCYTIC NEVI: Status: ACTIVE | Noted: 2019-11-22

## 2019-11-27 LAB
MEV IGG SER IA-ACNC: 114 AU/ML
MUV IGG SER IA-ACNC: 20.6 AU/ML
RUBV IGG SERPL IA-ACNC: 2.1 INDEX

## 2020-01-22 ENCOUNTER — OFFICE VISIT (OUTPATIENT)
Dept: FAMILY MEDICINE CLINIC | Facility: CLINIC | Age: 35
End: 2020-01-22
Payer: COMMERCIAL

## 2020-01-22 VITALS
HEIGHT: 64 IN | RESPIRATION RATE: 16 BRPM | SYSTOLIC BLOOD PRESSURE: 122 MMHG | TEMPERATURE: 97.6 F | WEIGHT: 204 LBS | HEART RATE: 71 BPM | OXYGEN SATURATION: 98 % | BODY MASS INDEX: 34.83 KG/M2 | DIASTOLIC BLOOD PRESSURE: 70 MMHG

## 2020-01-22 DIAGNOSIS — B34.9 VIRAL ILLNESS: Primary | ICD-10-CM

## 2020-01-22 PROCEDURE — 3008F BODY MASS INDEX DOCD: CPT | Performed by: FAMILY MEDICINE

## 2020-01-22 PROCEDURE — 99212 OFFICE O/P EST SF 10 MIN: CPT | Performed by: FAMILY MEDICINE

## 2020-01-22 RX ORDER — BENZONATATE 200 MG/1
200 CAPSULE ORAL 3 TIMES DAILY PRN
Qty: 20 CAPSULE | Refills: 0 | Status: SHIPPED | OUTPATIENT
Start: 2020-01-22 | End: 2021-08-19

## 2020-01-22 NOTE — PROGRESS NOTES
Assessment/Plan:    Viral URI  - Conservative measures discussed including humidifier use for congestion and warm water/tea with honey for sore throat  - Discussed the importance of avoiding unnecessary antibiotic therapy  - Tylenol or Motrin prn for fevers  - Nasal saline spray for congestion, Tessalon perles prn for cough  - Call in 2-3 days if symptoms aren't improving, return precautions discussed  - Follow up as needed  Subjective:      Patient ID: Kandi Bearden is a 37 y o  female  HPI  This is a 37year old female presenting with cough and sore throat that started Monday  She states she has felt more tired the last couple of days  She has not tried to take anything over the counter to help alleviate her symptoms  Patient denies fevers, chills, headache, nausea, vomiting, diarrhea or rashes  Sick contacts include her daughter who is also being seen the in office today for similar symptoms  Patient was offered the flu vaccine earlier this season, however refused despite counseling  The following portions of the patient's history were reviewed and updated as appropriate: allergies, current medications, past family history, past medical history, past social history, past surgical history and problem list     Review of Systems   Constitutional: Negative for activity change, fatigue and fever  HENT: Negative for congestion, ear pain, sneezing and sore throat  Respiratory: Negative for cough, shortness of breath and wheezing  Cardiovascular: Negative for chest pain  Gastrointestinal: Negative for abdominal pain, constipation, diarrhea, nausea and vomiting  Skin: Negative  Neurological: Negative for dizziness and headaches  Objective:  /70   Pulse 71   Temp 97 6 °F (36 4 °C)   Resp 16   Ht 5' 4" (1 626 m)   Wt 92 5 kg (204 lb)   SpO2 98%   BMI 35 02 kg/m²      Physical Exam   Constitutional: She is oriented to person, place, and time   She appears well-developed and well-nourished  No distress  HENT:   Head: Normocephalic and atraumatic  Right Ear: External ear normal    Left Ear: External ear normal    Mouth/Throat: No oropharyngeal exudate  Eyes: Conjunctivae are normal  Right eye exhibits no discharge  Left eye exhibits no discharge  No scleral icterus  Cardiovascular: Normal rate, regular rhythm and normal heart sounds  Exam reveals no gallop and no friction rub  No murmur heard  Pulmonary/Chest: Effort normal and breath sounds normal  No respiratory distress  She has no wheezes  She has no rales  Abdominal: Soft  Bowel sounds are normal  She exhibits no distension  There is no tenderness  There is no rebound and no guarding  Musculoskeletal: She exhibits no edema  Lymphadenopathy:     She has no cervical adenopathy  Neurological: She is alert and oriented to person, place, and time  Skin: Skin is warm and dry  No rash noted  She is not diaphoretic  No erythema  No pallor  Psychiatric: She has a normal mood and affect  Her behavior is normal    Vitals reviewed

## 2020-08-27 ENCOUNTER — TELEMEDICINE (OUTPATIENT)
Dept: FAMILY MEDICINE CLINIC | Facility: CLINIC | Age: 35
End: 2020-08-27
Payer: COMMERCIAL

## 2020-08-27 DIAGNOSIS — Z20.822 EXPOSURE TO COVID-19 VIRUS: Primary | ICD-10-CM

## 2020-08-27 PROCEDURE — 99213 OFFICE O/P EST LOW 20 MIN: CPT | Performed by: FAMILY MEDICINE

## 2020-08-27 NOTE — PROGRESS NOTES
COVID-19 Virtual Visit     Assessment/Plan:    Problem List Items Addressed This Visit        Other    Exposure to COVID-19 virus - Primary        This virtual check-in was done via telephone  Disposition:      I referred Kyle Findlovely to one of our centralized sites for a COVID-19 swab as well as 14 day quarantine  I spent 20 minutes directly with the patient during this visit    Encounter provider Demetra Rajput MD    Provider located at [address] [place] [phone]    Recent Visits  No visits were found meeting these conditions  Showing recent visits within past 7 days and meeting all other requirements     Future Appointments  No visits were found meeting these conditions  Showing future appointments within next 150 days and meeting all other requirements        Patient agrees to participate in a virtual check in via telephone or video visit instead of presenting to the office to address urgent/immediate medical needs  Patient is aware this is a billable service  After connecting through telephone, the patient was identified by name and date of birth  Yao Mclaughlin was informed that this was a telemedicine visit and that the exam was being conducted confidentially over secure lines  My office door was closed  No one else was in the room  Yao Mclaughlin acknowledged consent and understanding of privacy and security of the telemedicine visit  I informed the patient that I have reviewed her record in Epic and presented the opportunity for her to ask any questions regarding the visit today  The patient agreed to participate  Subjective  Yao Mclaughlin is a 37 y o  female who is concerned about COVID-19  She drove down to [place] yesterday night to   her 2 daughters who were exposed to their confirmed COVID-19 positive symptomatic uncle for the past 6 days   Given her confirmed exposure with her brother who is Covid positive, as well as recent onset of cough, she would like to be tested for Covid 19  Patient works as a home health aide  She reports cough  She has requires screening for symptoms in light of positive covid 19 exposure  She has had contact with a person who is under investigation for or who is positive for COVID-19 within the last 14 days  She has not been hospitalized recently for fever and/or lower respiratory symptoms  Past Medical History:   Diagnosis Date    Gallstones     Hypertension     With pregnancy    Kidney stones        Past Surgical History:   Procedure Laterality Date     SECTION       &     CHOLECYSTECTOMY      lap    EXTRACORPOREAL SHOCK WAVE LITHOTRIPSY Right 12/10/2010    15mm stone    EXTRACORPOREAL SHOCK WAVE LITHOTRIPSY Right 2011    5mm stone  Did not have f/u x-ray after [facility]  Sent reminder and slip  Negligent patient   INDUCED       [place] CYSTO/URETERO W/LITHOTRIPSY &INDWELL STENT INSRT Right 2016    Procedure: CYSTOSCOPY, RETROGRADE PYELOGRAM,URETEROSCOPY WITH HOLMIUM LASER LITHOTRIPSY,STONE BASKET EXTRACTION,  AND INSERTION URETERAL STENT;  Surgeon: Lynette Childress MD;  Location: [address] [place] [address];  Service: Urology    [place] CYSTOURETHROSCOPY,URETER CATHETER Right 2016    Procedure: CYSTOSCOPY RETROGRADE PYELOGRAM WITH INSERTION STENT URETERAL;  Surgeon: Lynette Childress MD;  Location: [address] [place] [address];  Service: Urology    TUBAL LIGATION      URETERAL STENT PLACEMENT Right 2010    Pregnant    URETERAL STENT PLACEMENT Right 2010    Replaced still pregnant    URETERAL STENT PLACEMENT Right 2010    Still Pregnant       Current Outpatient Medications   Medication Sig Dispense Refill    benzonatate (TESSALON) 200 MG capsule Take 1 capsule (200 mg total) by mouth 3 (three) times a day as needed for cough 20 capsule 0     No current facility-administered medications for this visit           No Known Allergies    Review of Systems   Constitutional: Negative  HENT: Negative  Respiratory: Negative  Cardiovascular: Negative  Gastrointestinal: Negative  Genitourinary: Negative  VIRTUAL VISIT DISCLAIMER    Lorenzo Lewis acknowledges that she has consented to an online visit or consultation  She understands that the online visit is based solely on information provided by her, and that, in the absence of a face-to-face physical evaluation by the physician, the diagnosis she receives is both limited and provisional in terms of accuracy and completeness  This is not intended to replace a full medical face-to-face evaluation by the physician  Lorenzo Lewis understands and accepts these terms  --  Myles Smith MD    "This note has been constructed using a voice recognition system  Therefore there may be syntax, spelling, and/or grammatical errors  Please call if you have any questions   All questions and concerns were addressed and answered by myself "

## 2020-08-28 DIAGNOSIS — Z20.822 EXPOSURE TO COVID-19 VIRUS: ICD-10-CM

## 2020-08-28 PROCEDURE — U0003 INFECTIOUS AGENT DETECTION BY NUCLEIC ACID (DNA OR RNA); SEVERE ACUTE RESPIRATORY SYNDROME CORONAVIRUS 2 (SARS-COV-2) (CORONAVIRUS DISEASE [COVID-19]), AMPLIFIED PROBE TECHNIQUE, MAKING USE OF HIGH THROUGHPUT TECHNOLOGIES AS DESCRIBED BY CMS-2020-01-R: HCPCS | Performed by: STUDENT IN AN ORGANIZED HEALTH CARE EDUCATION/TRAINING PROGRAM

## 2020-08-30 LAB — SARS-COV-2 RNA SPEC QL NAA+PROBE: NOT DETECTED

## 2020-09-01 ENCOUNTER — TELEPHONE (OUTPATIENT)
Dept: URGENT CARE | Facility: CLINIC | Age: 35
End: 2020-09-01

## 2020-09-01 NOTE — TELEPHONE ENCOUNTER
Pt called for COVID-19 test results from 8/28/2020  Advised that resukts were negative (no virus detected)  States she is feeling better

## 2020-09-02 ENCOUNTER — TELEPHONE (OUTPATIENT)
Dept: OTHER | Facility: HOSPITAL | Age: 35
End: 2020-09-02

## 2020-09-02 NOTE — TELEPHONE ENCOUNTER
Called patient and informed her that she and her 2 daughters are Covid negative  She denies any symptoms at this time

## 2020-10-30 ENCOUNTER — NURSE TRIAGE (OUTPATIENT)
Dept: OTHER | Facility: OTHER | Age: 35
End: 2020-10-30

## 2020-10-30 DIAGNOSIS — Z20.828 SARS-ASSOCIATED CORONAVIRUS EXPOSURE: Primary | ICD-10-CM

## 2020-10-31 DIAGNOSIS — Z20.828 SARS-ASSOCIATED CORONAVIRUS EXPOSURE: ICD-10-CM

## 2020-10-31 PROCEDURE — U0003 INFECTIOUS AGENT DETECTION BY NUCLEIC ACID (DNA OR RNA); SEVERE ACUTE RESPIRATORY SYNDROME CORONAVIRUS 2 (SARS-COV-2) (CORONAVIRUS DISEASE [COVID-19]), AMPLIFIED PROBE TECHNIQUE, MAKING USE OF HIGH THROUGHPUT TECHNOLOGIES AS DESCRIBED BY CMS-2020-01-R: HCPCS | Performed by: STUDENT IN AN ORGANIZED HEALTH CARE EDUCATION/TRAINING PROGRAM

## 2020-11-02 LAB — SARS-COV-2 RNA SPEC QL NAA+PROBE: NOT DETECTED

## 2020-11-03 ENCOUNTER — TELEPHONE (OUTPATIENT)
Dept: FAMILY MEDICINE CLINIC | Facility: CLINIC | Age: 35
End: 2020-11-03

## 2021-08-19 ENCOUNTER — OFFICE VISIT (OUTPATIENT)
Dept: URGENT CARE | Facility: CLINIC | Age: 36
End: 2021-08-19
Payer: COMMERCIAL

## 2021-08-19 VITALS
HEART RATE: 63 BPM | OXYGEN SATURATION: 99 % | WEIGHT: 184.4 LBS | BODY MASS INDEX: 31.48 KG/M2 | RESPIRATION RATE: 18 BRPM | SYSTOLIC BLOOD PRESSURE: 110 MMHG | TEMPERATURE: 97.2 F | HEIGHT: 64 IN | DIASTOLIC BLOOD PRESSURE: 78 MMHG

## 2021-08-19 DIAGNOSIS — L23.7 ALLERGIC CONTACT DERMATITIS DUE TO PLANTS, EXCEPT FOOD: Primary | ICD-10-CM

## 2021-08-19 PROCEDURE — 99213 OFFICE O/P EST LOW 20 MIN: CPT | Performed by: FAMILY MEDICINE

## 2021-08-19 RX ORDER — PREDNISONE 20 MG/1
20 TABLET ORAL 2 TIMES DAILY WITH MEALS
Qty: 7 TABLET | Refills: 0 | Status: SHIPPED | OUTPATIENT
Start: 2021-08-19 | End: 2021-08-21

## 2021-08-19 NOTE — PROGRESS NOTES
[address]        NAME: Santiago Wheatley is a 38 y o  female  : 1985    MRN: [id number]  DATE: 2021  TIME: 1:10 PM    Assessment and Plan   Allergic contact dermatitis due to plants, except food [L23 7]  1  Allergic contact dermatitis due to plants, except food  predniSONE 20 mg tablet    diphenhydrAMINE (BENADRYL) 2 % cream     Contact dermatitis from poison ivy  Prescribed a 5 day course of steroids to be used with a daily antihistamine  Topical Benadryl prescribed as needed for breakthrough itching  Patient Instructions     Follow up with PCP in 3-5 days  Proceed to  ER if symptoms worsen  Chief Complaint     Chief Complaint   Patient presents with    Rash     ? poison rash x one week - itchy  Tried Calamine  Has red rash on upper arms to neck and chest, abdomen and inner thighs  History of Present Illness     49-year-old female presents today with a pruritic rash generalized over the body concerning for poison ivy  Did not have any direct contact, but believes her dog ran through a lot of poison ivy bushes that are evident in her neighborhood and then made contact with her  Has been experiencing an itchy rash over the past 1 week  Tried applying calamine lotion to no avail  Denies any other associated symptoms  Review of Systems   Review of Systems   Constitutional: Negative for chills and fever  HENT: Negative for congestion, rhinorrhea and sore throat  Respiratory: Negative for cough, shortness of breath and wheezing  Cardiovascular: Negative for chest pain  Gastrointestinal: Negative for abdominal pain and nausea  Musculoskeletal: Negative for arthralgias  Skin: Positive for rash  Negative for color change  Neurological: Negative for dizziness and headaches       Current Medications       Current Outpatient Medications:     CANNABIDIOL PO, Take by mouth as needed (medical marijuana), Disp: , Rfl:     diphenhydrAMINE (BENADRYL) 2 % cream, Apply topically 3 (three) times a day as needed for itching, Disp: 30 g, Rfl: 0    predniSONE 20 mg tablet, Take 1 tablet (20 mg total) by mouth 2 (two) times a day with meals for 2 days ; then 1 tablet in the morning x3 days  , Disp: 7 tablet, Rfl: 0    Current Allergies     Allergies as of 2021    (No Known Allergies)            The following portions of the patient's history were reviewed and updated as appropriate: allergies, current medications, past family history, past medical history, past social history, past surgical history and problem list      Past Medical History:   Diagnosis Date    Anxiety     Depression     Gallstones     Hypertension     With pregnancy    Kidney stones     Kidney stones     bilateral    Urinary tract infection        Past Surgical History:   Procedure Laterality Date     SECTION       &     CHOLECYSTECTOMY      lap    EXTRACORPOREAL SHOCK WAVE LITHOTRIPSY Right 12/10/2010    15mm stone    EXTRACORPOREAL SHOCK WAVE LITHOTRIPSY Right 2011    5mm stone  Did not have f/u x-ray after [facility]  Sent reminder and slip  Negligent patient      INDUCED       ID CYSTO/URETERO W/LITHOTRIPSY &INDWELL STENT INSRT Right 2016    Procedure: CYSTOSCOPY, RETROGRADE PYELOGRAM,URETEROSCOPY WITH HOLMIUM LASER LITHOTRIPSY,STONE BASKET EXTRACTION,  AND INSERTION URETERAL STENT;  Surgeon: Michel Jaramillo MD;  Location: [address] [place] [address];  Service: Urology    ID CYSTOURETHROSCOPY,URETER CATHETER Right 2016    Procedure: CYSTOSCOPY RETROGRADE PYELOGRAM WITH INSERTION STENT URETERAL;  Surgeon: Michel Jaramillo MD;  Location: [address] [place] [address];  Service: Urology    TUBAL LIGATION      URETERAL STENT PLACEMENT Right 2010    Pregnant    URETERAL STENT PLACEMENT Right 2010    Replaced still pregnant    URETERAL STENT PLACEMENT Right 2010    Still Pregnant       Family History   Problem Relation Age of Onset    Cancer Mother    Yolandayessenia Shasha Kidney disease Mother     Cirrhosis Mother         Hepatic    Cancer Maternal Grandmother     Diabetes Maternal Grandmother     Kidney disease Maternal Grandmother          Medications have been verified  Objective   /78   Pulse 63   Temp (!) 97 2 °F (36 2 °C)   Resp 18   Ht 5' 4" (1 626 m)   Wt 83 6 kg (184 lb 6 4 oz)   LMP 08/10/2021 (Exact Date)   SpO2 99%   BMI 31 65 kg/m²   Patient's last menstrual period was 08/10/2021 (exact date)  Physical Exam     Physical Exam  Vitals and nursing note reviewed  Constitutional:       General: She is in acute distress (Pruritic rash)  Appearance: Normal appearance  She is normal weight  She is not ill-appearing, toxic-appearing or diaphoretic  HENT:      Head: Normocephalic and atraumatic  Eyes:      General:         Right eye: No discharge  Left eye: No discharge  Conjunctiva/sclera: Conjunctivae normal    Pulmonary:      Effort: Pulmonary effort is normal    Skin:     General: Skin is warm  Findings: Rash (Papular rash located over the left shoulder, both upper extremities, and both eyes  ) present  No erythema  Neurological:      General: No focal deficit present  Mental Status: She is alert and oriented to person, place, and time  Psychiatric:         Mood and Affect: Mood normal          Behavior: Behavior normal          Thought Content:  Thought content normal          Judgment: Judgment normal

## 2021-09-13 ENCOUNTER — VBI (OUTPATIENT)
Dept: ADMINISTRATIVE | Facility: OTHER | Age: 36
End: 2021-09-13

## 2021-12-01 ENCOUNTER — TELEPHONE (OUTPATIENT)
Dept: FAMILY MEDICINE CLINIC | Facility: CLINIC | Age: 36
End: 2021-12-01

## 2021-12-01 DIAGNOSIS — Z11.52 ENCOUNTER FOR SCREENING FOR COVID-19: Primary | ICD-10-CM

## 2021-12-22 DIAGNOSIS — B85.2 LICE: Primary | ICD-10-CM

## 2022-02-07 ENCOUNTER — TELEPHONE (OUTPATIENT)
Dept: FAMILY MEDICINE CLINIC | Facility: CLINIC | Age: 37
End: 2022-02-07

## 2022-02-07 DIAGNOSIS — B85.2 LICE: Primary | ICD-10-CM

## 2022-02-07 NOTE — PROGRESS NOTES
Medication initially ordered by Dr Mile Whittaker did not go through the pharmacy  Reordering the same medication for lice

## 2022-02-07 NOTE — TELEPHONE ENCOUNTER
Fax received from pharmacy regarding lice shampoo  Dr Eri Cruz in initially sent script, pharmacy does not accept her license number  Please resend under your name   Thank you

## 2022-05-06 ENCOUNTER — OFFICE VISIT (OUTPATIENT)
Dept: FAMILY MEDICINE CLINIC | Facility: CLINIC | Age: 37
End: 2022-05-06
Payer: COMMERCIAL

## 2022-05-06 VITALS
OXYGEN SATURATION: 99 % | BODY MASS INDEX: 31.82 KG/M2 | DIASTOLIC BLOOD PRESSURE: 92 MMHG | WEIGHT: 186.4 LBS | RESPIRATION RATE: 18 BRPM | TEMPERATURE: 97 F | HEIGHT: 64 IN | HEART RATE: 74 BPM | SYSTOLIC BLOOD PRESSURE: 146 MMHG

## 2022-05-06 DIAGNOSIS — F41.9 ANXIETY AND DEPRESSION: ICD-10-CM

## 2022-05-06 DIAGNOSIS — Z13.220 ENCOUNTER FOR LIPID SCREENING FOR CARDIOVASCULAR DISEASE: ICD-10-CM

## 2022-05-06 DIAGNOSIS — Z13.6 ENCOUNTER FOR LIPID SCREENING FOR CARDIOVASCULAR DISEASE: ICD-10-CM

## 2022-05-06 DIAGNOSIS — N13.2 HYDRONEPHROSIS WITH RENAL AND URETERAL CALCULOUS OBSTRUCTION: ICD-10-CM

## 2022-05-06 DIAGNOSIS — F32.A ANXIETY AND DEPRESSION: ICD-10-CM

## 2022-05-06 DIAGNOSIS — R03.0 ELEVATED BP WITHOUT DIAGNOSIS OF HYPERTENSION: ICD-10-CM

## 2022-05-06 DIAGNOSIS — R13.13 PHARYNGEAL DYSPHAGIA: Primary | ICD-10-CM

## 2022-05-06 DIAGNOSIS — R09.82 POST-NASAL DRIP: ICD-10-CM

## 2022-05-06 DIAGNOSIS — Z11.59 NEED FOR HEPATITIS C SCREENING TEST: ICD-10-CM

## 2022-05-06 PROBLEM — Z20.822 EXPOSURE TO COVID-19 VIRUS: Status: RESOLVED | Noted: 2020-08-27 | Resolved: 2022-05-06

## 2022-05-06 PROBLEM — M77.11 EPICONDYLITIS, LATERAL, RIGHT: Status: RESOLVED | Noted: 2018-11-30 | Resolved: 2022-05-06

## 2022-05-06 PROBLEM — M54.9 BACK PAIN: Status: RESOLVED | Noted: 2017-05-25 | Resolved: 2022-05-06

## 2022-05-06 PROCEDURE — 1036F TOBACCO NON-USER: CPT | Performed by: FAMILY MEDICINE

## 2022-05-06 PROCEDURE — 99213 OFFICE O/P EST LOW 20 MIN: CPT | Performed by: FAMILY MEDICINE

## 2022-05-06 PROCEDURE — 3008F BODY MASS INDEX DOCD: CPT | Performed by: FAMILY MEDICINE

## 2022-05-06 RX ORDER — FLUTICASONE PROPIONATE 50 MCG
1 SPRAY, SUSPENSION (ML) NASAL DAILY
Qty: 18.2 ML | Refills: 0 | Status: SHIPPED | OUTPATIENT
Start: 2022-05-06 | End: 2022-06-06

## 2022-05-06 NOTE — PROGRESS NOTES
Anneliese Paez  43 y o   [birth year]      CC: metallic taste when she coughs, feels like there is mucous stuck in her throat    This patient has not been seen in our office for a few years now  There are many chronic issues that need to be addressed  Encouraged patient to follow-up with annual physical   I have given her routine blood work to be done before coming back for annual physical   We mainly addressed patient's pharyngeal dysphagia which has been going on for years now  She had been given an ENT referral previously but did not follow-up with them  Please see below for problem specific orders and rationale  Assessment and Plan    Problem List Items Addressed This Visit        Genitourinary    Hydronephrosis with renal and ureteral calculous obstruction    Relevant Orders    Ambulatory Referral to Urology    Patient states that she has renal colic on and off  She has a history of stent placement and hydronephrosis  She has not followed up with Urology for many years  Will screen for CKD with CMP  Discussed that chronic renal failure can cause metallic taste  Other    Anxiety and depression  Patient currently smoking marijuana via vape with medical marijuana card  Discussed with patient that any inhalation products will cause irritation in her throat and will cause excess mucus production  This may be causing her symptoms  Encouraged patient to follow-up with therapist in the area (list given) or to discuss other options for treating her anxiety and depression  Other Visit Diagnoses     Pharyngeal dysphagia    -  Primary    Relevant Orders    TSH, 3rd generation with Free T4 reflex    Ambulatory Referral to Otolaryngology    Thyroid gland does not feel enlarged on exam, however since this is the area that patient is feeling discomfort, will check a TSH  As this has been going on for years now, patient should follow-up with ENT specialist for possible laryngoscopy        Need for hepatitis C screening test        Relevant Orders    Hepatitis C Antibody ([facility])  Routine screening    BMI 32 0-32 9,adult        Relevant Orders    Lipid Panel with Direct LDL reflex    HEMOGLOBIN A1C W/ EAG ESTIMATION    Comprehensive metabolic panel  Patient states she has been working on losing weight and has been successful in losing over 40 lb in the past year  Congratulated patient on her weight loss  Patient's BMI today is 32 00  Will screen for fatty liver disease with CMP, diabetes with hemoglobin A1c, and hyperlipidemia with lipid panel  Encounter for lipid screening for cardiovascular disease        Relevant Orders    Lipid Panel with Direct LDL reflex    Post-nasal drip        Relevant Medications    fluticasone (FLONASE) 50 mcg/act nasal spray  As patient feels like there is mucus in her throat, this could be secondary to postnasal drip  Will do a trial of Flonase  As stated above, I think this excess mucus production is likely secondary to chronic use of vape marijuana  Elevated BP without diagnosis of hypertension     BP elevated at 146/92  Dash diet provided in wrap up  Previous blood pressure was normal             Patient understands and agrees with the plan  Plan discussed with attending- Dr Yesenia Goldberg:     Patient is a 24-year-old female who presents with metallic taste in her mouth and feeling like there is mucus caught in her throat  This has been going on for few years now  She was previously given a referral to ENT but patient did not follow-up with this  She has been using a medical marijuana vape for the past 2 years for anxiety and depression  She received her medical marijuana card from a doctor online  She states that she continuously tries to cough the mucus up but she is unable to do so  She has no difficulty swallowing or breathing  Review of Systems   Constitutional: Negative for chills and fever     HENT: Positive for congestion (nasal congestion) and postnasal drip  Negative for trouble swallowing  Eyes: Negative for visual disturbance  Respiratory: Positive for cough (trying to clear mucous in throat)  Negative for wheezing  Cardiovascular: Negative for chest pain and palpitations  Gastrointestinal: Positive for diarrhea (few days last week)  Negative for abdominal pain, constipation and vomiting  Genitourinary: Positive for flank pain (chronic renal colic)  Neurological: Negative for dizziness  The following portions of the patient's history were reviewed and updated as appropriate:  current medications, past family history, past medical history, past social history, past surgical history and problem list     Current Outpatient Medications on File Prior to Visit   Medication Sig Dispense Refill    diphenhydrAMINE (BENADRYL) 2 % cream Apply topically 3 (three) times a day as needed for itching 30 g 0    [DISCONTINUED] CANNABIDIOL PO Take by mouth as needed (medical marijuana) (Patient not taking: Reported on 5/6/2022 )       No current facility-administered medications on file prior to visit  Objective:    /92 (BP Location: Left arm, Patient Position: Sitting, Cuff Size: Adult)   Pulse 74   Temp (!) 97 °F (36 1 °C) (Tympanic)   Resp 18   Ht 5' 4" (1 626 m)   Wt 84 6 kg (186 lb 6 4 oz)   SpO2 99%   BMI 32 00 kg/m²     Physical Exam  Constitutional:       General: She is not in acute distress  Appearance: She is not ill-appearing  HENT:      Nose: Congestion present  Mouth/Throat:      Comments: Cobblestoning in pharynx  Neck:      Comments: Thyroid does not feel enlarged and no nodules were appreciated  Pulmonary:      Effort: No respiratory distress  Abdominal:      Tenderness: There is no right CVA tenderness or left CVA tenderness  Neurological:      General: No focal deficit present  Mental Status: She is alert and oriented to person, place, and time     Psychiatric:         Mood and Affect: Mood normal          Behavior: Behavior normal                    Some portions of this record may have been generated with voice recognition software  There may be translation, syntax, or grammatical errors  Occasional wrong word or "sound-a-like" substitutions may have occurred due to the inherent limitations of the voice recognition software       [address] [facility]   PGY3

## 2022-05-06 NOTE — PATIENT INSTRUCTIONS
DASH Eating Plan   AMBULATORY CARE:   The DASH (Dietary Approaches to Stop Hypertension) Eating Plan  is designed to help prevent or lower high blood pressure  It can also help to lower LDL (bad) cholesterol and decrease your risk for heart disease  The plan is low in sodium, sugar, unhealthy fats, and total fat  It is high in potassium, calcium, magnesium, and fiber  These nutrients are added when you eat more fruits, vegetables, and whole grains  With the DASH eating plan, you need to eat a certain number of servings from each food group  This will help you get enough of certain nutrients and limit others  The amount of servings you should eat depends on how many calories you need  Your dietitian can help you create meal plans with the right number of servings for each food group  What you need to know about sodium:  Your dietitian will tell you how much sodium is safe for you to have each day  People with high blood pressure should have no more than 1,500 to 2,300 mg of sodium in a day  A teaspoon (tsp) of salt has 2,300 mg of sodium  This may seem like a difficult goal, but small changes to the foods you eat can make a big difference  Your healthcare provider or dietitian can help you create a meal plan that follows your sodium limit  · Read food labels  Food labels can help you choose foods that are low in sodium  The amount of sodium is listed in milligrams (mg)  The % Daily Value (DV) column tells you how much of your daily needs are met by 1 serving of the food for each nutrient listed  Choose foods that have less than 5% of the DV of sodium  These foods are considered low in sodium  Foods that have 20% or more of the DV of sodium are considered high in sodium  Avoid foods that have more than 300 mg of sodium in each serving  Choose foods that say low-sodium, reduced-sodium, or no salt added on the food label  · Limit added salt  Do not salt food at the table if you add salt when you cook  Use herbs and spices, such as onions, garlic, and salt-free seasonings to add flavor  Try lemon or lime juice or vinegar to add a tart flavor  Use hot peppers or a small amount of hot pepper sauce to add a spicy flavor  Limit foods high in added salt, such as the following:    ? Seasonings made with salt, such as garlic salt, celery salt, onion salt, seasoned salt, meat tenderizers, and monosodium glutamate (MSG)    ? Miso soup and canned or dried soup mixes    ? Regular soy sauce, barbecue sauce, teriyaki sauce, steak sauce, Worcestershire sauce, and most flavored vinegars    ? Snack foods, such as salted chips, popcorn, pretzels, pork rinds, salted crackers, and salted nuts    ? Frozen foods, such as dinners, entrees, vegetables with sauces, and breaded meats    · Ask about salt substitutes  Ask your healthcare provider if you may use salt substitutes  Some salt substitutes have ingredients that can be harmful if you have certain health conditions  · Choose foods carefully at restaurants  Meals from restaurants, especially fast food restaurants, are often high in sodium  Some restaurants have nutrition information that tells you the amount of sodium in their foods  Ask to have your food prepared with less, or no salt  What you need to know about fats:  Healthy fats include unsaturated fats and omega-3 fatty acids  Unhealthy fats include saturated fats and trans fats  · Include healthy fats, such as the following:      ? Cooking oils, such as soybean, canola, olive, or sunflower    ? Fatty fish, such as salmon, tuna, mackerel, or sardines    ? Flaxseed oil or ground flaxseed    ? ½ cup of cooked beans, such as black beans, kidney beans, or steve beans    ? 1½ ounces of low-sodium nuts, such as almonds or walnuts    ? Low-sugar, low-sodium peanut butter    ? Seeds such as anita seeds or sunflower seeds       · Limit or do not have unhealthy fats, such as the following:      ?  Foods that contain fat from animals, such as fatty meats, whole milk, butter, and cream    ? Shortening, stick margarine, palm oil, and coconut oil    ? Full-fat or creamy salad dressing    ? Creamy soup    ? Crackers, chips, and baked goods made with margarine or shortening    ? Foods that are fried in unhealthy fats    ? Gravy and sauces, such as Gomez or cheese sauces    What you need to know about carbohydrates (carbs): All carbs break down into sugar  Complex carbs contain more fiber than simple carbs  This means complex carbs go into the bloodstream more slowly and cause less of a blood sugar spike  Try to include more complex carbs and fewer simple carbs  · Include complex carbs, such as the following:      ? 1 slice of whole-grain bread    ? 1 ounce of dry cereal that does not contain added sugar    ? ½ cup of cooked oatmeal    ? 2 ounces of cooked whole-grain pasta    ? ½ cup of cooked brown rice    · Limit or do not have simple carbs, such as the following:      ? Baked goods, such as doughnuts, pastries, and cookies    ? Mixes for cornbread and biscuits    ? White rice and pasta mixes, such as boxed macaroni and cheese    ? Instant and cold cereals that contain sugar    ? Jelly, jam, and ice cream that contain sugar    ? Condiments such as ketchup    ? Drinks high in sugar, such as soft drinks, lemonade, and fruit juice    What you need to know about vegetables and fruits:  Vegetables and fruits can be fresh, frozen, or canned  If possible, try to choose low-sodium canned options  · Include a variety of vegetables and fruits, such as the following:      ? 1 medium apple, pear, or peach (about ½ cup chopped)    ? ½ small banana    ? ½ cup berries, such as blueberries, strawberries, or blackberries    ? 1 cup of raw leafy greens, such as lettuce, spinach, kale, or yanni greens    ? ½ cup of frozen or canned (no added salt) vegetables, such as green beans    ?  ½ cup of fresh, frozen, or canned fruit (canned in light syrup or fruit juice)    ? ½ cup of vegetable or fruit juice    · Limit or do not have vegetables and fruits made in the following ways:      ? Frozen fruit such as cherries that have added sugar    ? Fruit in cream or butter sauce    ? Canned vegetables that are high in sodium    ? Sauerkraut, pickled vegetables, and other foods prepared in brine    ? Fried vegetables or vegetables in butter or high-fat sauces    What you need to know about protein foods:   · Include lean or low-fat protein foods, such as the following:      ? Poultry (chicken, turkey) with no skin    ? Fish (especially fatty fish, such as salmon, fresh tuna, or mackerel)    ? Lean beef and pork (loin, round, extra lean hamburger)    ? Egg whites and egg substitutes    ? 1 cup of nonfat (skim) or 1% milk    ? 1½ ounces of fat-free or low-fat cheese    ? 6 ounces of nonfat or low-fat yogurt    · Limit or do not have high-fat protein foods, such as the following:      ? Smoked or cured meat, such as corned beef, magallanes, ham, hot dogs, and sausage    ? Canned beans and canned meats or spreads, such as potted meats, sardines, anchovies, and imitation seafood    ? Deli or lunch meats, such as bologna, ham, turkey, and roast beef    ? High-fat meat (T-bone steak, regular hamburger, and ribs)    ? Whole eggs and egg yolks    ? Whole milk, 2% milk, and cream    ? Regular cheese and processed cheese    Other guidelines to follow:   · Maintain a healthy weight  Your risk for heart disease is higher if you are overweight  Your healthcare provider may suggest that you lose weight if you are overweight  You can lose weight by eating fewer calories and foods that have added sugars and fat  The DASH meal plan can help you do this  Decrease calories by eating smaller portions at each meal and fewer snacks  Ask your healthcare provider for more information about how to lose weight  · Exercise regularly  Regular exercise can help you reach or maintain a healthy weight  Regular exercise can also help decrease your blood pressure and improve your cholesterol levels  Get 30 minutes or more of moderate exercise each day of the week  To lose weight, get at least 60 minutes of exercise  Talk to your healthcare provider about the best exercise program for you  · Limit alcohol  Women should limit alcohol to 1 drink a day  Men should limit alcohol to 2 drinks a day  A drink of alcohol is 12 ounces of beer, 5 ounces of wine, or 1½ ounces of liquor  For more information:   · [facility] and [address]  [address] [address]  [place] [address]  Phone: 5- 395 - 653-1446  Web Address: [facility] no    © 9[address] [facility] 2022 Information is for End User's use only and may not be sold, redistributed or otherwise used for commercial purposes  All illustrations and images included in CareNotes® are the copyrighted property of A D A M , Inc  or [address] [place] [address]  The above information is an  only  It is not intended as medical advice for individual conditions or treatments  Talk to your doctor, nurse or pharmacist before following any medical regimen to see if it is safe and effective for you

## 2022-05-10 LAB
ALBUMIN SERPL-MCNC: 4.5 G/DL (ref 3.8–4.8)
ALBUMIN/GLOB SERPL: 1.7 {RATIO} (ref 1.2–2.2)
ALP SERPL-CCNC: 64 IU/L (ref 44–121)
ALT SERPL-CCNC: 13 IU/L (ref 0–32)
AST SERPL-CCNC: 16 IU/L (ref 0–40)
BILIRUB SERPL-MCNC: 0.4 MG/DL (ref 0–1.2)
BUN SERPL-MCNC: 9 MG/DL (ref 6–20)
BUN/CREAT SERPL: 14 (ref 9–23)
CALCIUM SERPL-MCNC: 9.1 MG/DL (ref 8.7–10.2)
CHLORIDE SERPL-SCNC: 103 MMOL/L (ref 96–106)
CHOLEST SERPL-MCNC: 151 MG/DL (ref 100–199)
CO2 SERPL-SCNC: 23 MMOL/L (ref 20–29)
CREAT SERPL-MCNC: 0.64 MG/DL (ref 0.57–1)
EGFR: 116 ML/MIN/1.73
EST. AVERAGE GLUCOSE BLD GHB EST-MCNC: 111 MG/DL
GLOBULIN SER-MCNC: 2.6 G/DL (ref 1.5–4.5)
GLUCOSE SERPL-MCNC: 87 MG/DL (ref 65–99)
HBA1C MFR BLD: 5.5 % (ref 4.8–5.6)
HCV AB S/CO SERPL IA: <0.1 S/CO RATIO (ref 0–0.9)
HDLC SERPL-MCNC: 49 MG/DL
LDLC SERPL CALC-MCNC: 91 MG/DL (ref 0–99)
LDLC/HDLC SERPL: 1.9 RATIO (ref 0–3.2)
POTASSIUM SERPL-SCNC: 4.3 MMOL/L (ref 3.5–5.2)
PROT SERPL-MCNC: 7.1 G/DL (ref 6–8.5)
SL AMB VLDL CHOLESTEROL CALC: 11 MG/DL (ref 5–40)
SODIUM SERPL-SCNC: 141 MMOL/L (ref 134–144)
TRIGL SERPL-MCNC: 55 MG/DL (ref 0–149)
TSH SERPL DL<=0.005 MIU/L-ACNC: 1.84 UIU/ML (ref 0.45–4.5)

## 2022-06-06 ENCOUNTER — OFFICE VISIT (OUTPATIENT)
Dept: FAMILY MEDICINE CLINIC | Facility: CLINIC | Age: 37
End: 2022-06-06
Payer: COMMERCIAL

## 2022-06-06 ENCOUNTER — OFFICE VISIT (OUTPATIENT)
Dept: OTOLARYNGOLOGY | Facility: CLINIC | Age: 37
End: 2022-06-06
Payer: COMMERCIAL

## 2022-06-06 VITALS
BODY MASS INDEX: 30.56 KG/M2 | HEART RATE: 72 BPM | TEMPERATURE: 97.4 F | DIASTOLIC BLOOD PRESSURE: 78 MMHG | HEIGHT: 64 IN | OXYGEN SATURATION: 99 % | RESPIRATION RATE: 18 BRPM | SYSTOLIC BLOOD PRESSURE: 126 MMHG | WEIGHT: 179 LBS

## 2022-06-06 VITALS — BODY MASS INDEX: 30.56 KG/M2 | HEIGHT: 64 IN | TEMPERATURE: 97.4 F | WEIGHT: 179 LBS

## 2022-06-06 DIAGNOSIS — R06.83 SNORING: ICD-10-CM

## 2022-06-06 DIAGNOSIS — Z00.00 ANNUAL PHYSICAL EXAM: Primary | ICD-10-CM

## 2022-06-06 DIAGNOSIS — K21.9 LARYNGOPHARYNGEAL REFLUX (LPR): ICD-10-CM

## 2022-06-06 DIAGNOSIS — H61.23 BILATERAL IMPACTED CERUMEN: ICD-10-CM

## 2022-06-06 DIAGNOSIS — R13.13 PHARYNGEAL DYSPHAGIA: ICD-10-CM

## 2022-06-06 DIAGNOSIS — K21.9 GASTROESOPHAGEAL REFLUX DISEASE WITHOUT ESOPHAGITIS: ICD-10-CM

## 2022-06-06 DIAGNOSIS — R09.89 GLOBUS PHARYNGEUS: Primary | ICD-10-CM

## 2022-06-06 DIAGNOSIS — G47.30 SLEEP APNEA, UNSPECIFIED TYPE: ICD-10-CM

## 2022-06-06 DIAGNOSIS — F32.1 MODERATE MAJOR DEPRESSION (HCC): ICD-10-CM

## 2022-06-06 DIAGNOSIS — F17.290 OTHER TOBACCO PRODUCT NICOTINE DEPENDENCE, UNCOMPLICATED: ICD-10-CM

## 2022-06-06 PROCEDURE — 99204 OFFICE O/P NEW MOD 45 MIN: CPT | Performed by: STUDENT IN AN ORGANIZED HEALTH CARE EDUCATION/TRAINING PROGRAM

## 2022-06-06 PROCEDURE — 31575 DIAGNOSTIC LARYNGOSCOPY: CPT | Performed by: STUDENT IN AN ORGANIZED HEALTH CARE EDUCATION/TRAINING PROGRAM

## 2022-06-06 PROCEDURE — 69210 REMOVE IMPACTED EAR WAX UNI: CPT | Performed by: STUDENT IN AN ORGANIZED HEALTH CARE EDUCATION/TRAINING PROGRAM

## 2022-06-06 PROCEDURE — 99395 PREV VISIT EST AGE 18-39: CPT | Performed by: FAMILY MEDICINE

## 2022-06-06 PROCEDURE — 3725F SCREEN DEPRESSION PERFORMED: CPT | Performed by: FAMILY MEDICINE

## 2022-06-06 RX ORDER — BUPROPION HYDROCHLORIDE 150 MG/1
150 TABLET ORAL EVERY MORNING
Qty: 30 TABLET | Refills: 0 | Status: SHIPPED | OUTPATIENT
Start: 2022-06-06 | End: 2022-07-29

## 2022-06-06 RX ORDER — PANTOPRAZOLE SODIUM 40 MG/1
40 TABLET, DELAYED RELEASE ORAL DAILY
Qty: 30 TABLET | Refills: 3 | Status: SHIPPED | OUTPATIENT
Start: 2022-06-06 | End: 2022-07-29

## 2022-06-06 NOTE — PROGRESS NOTES
Specialty Physician [id number] [address] [facility]  [facility]  Otolaryngology -- Head and Neck Surgery New Patient Visit  Anne Fulton is a 43 y o  who presents with a chief complaint of       Here for globus sensation for 1 month  The patient denied any history of dysphagia, odynophagia, hoarseness, otalgia, hemoptysis or other body swellings  Also denied any history of night sweats, fever/chills, anorexia or weight loss  GERD:  history of heart burn  No history of burping/belching  No history of water brash/bad taste in the mouth    Relevant GI work up  No history of upper GI endoscopy  No history of swallowing studies       No significant PND or sinus issues  Ex smoker, switched to vape then medical marigutj  Also complaining of impacted cerumen in both ears  Snoring, morning headache, interrupted sleeping and chronic fatingue    Review of systems: Pertinent review of systems documented in the HPI  10 point ROS documented in a separate note, as necessary  Results reviewed; images from any scan have been personally reviewed: The past medical, surgical, social and family history have been reviewed as documented in today's record  Past Medical History:   Diagnosis Date    Anxiety     Depression     Epicondylitis, lateral, right 2018    Gallstones     Hypertension     With pregnancy    Kidney stones     Kidney stones     bilateral    Urinary tract infection      Past Surgical History:   Procedure Laterality Date     SECTION       &     CHOLECYSTECTOMY      lap    EXTRACORPOREAL SHOCK WAVE LITHOTRIPSY Right 12/10/2010    15mm stone    EXTRACORPOREAL SHOCK WAVE LITHOTRIPSY Right 2011    5mm stone  Did not have f/u x-ray after [facility]  Sent reminder and slip  Negligent patient      INDUCED       NE CYSTO/URETERO W/LITHOTRIPSY &INDWELL STENT INSRT Right 2016    Procedure: Clint Gonzalez PYELOGRAM,URETEROSCOPY WITH HOLMIUM LASER LITHOTRIPSY,STONE BASKET EXTRACTION,  AND INSERTION URETERAL STENT;  Surgeon: Lolis Cruz MD;  Location: [address] [place] [address];  Service: Urology    IL CYSTOURETHROSCOPY,URETER CATHETER Right 8/13/2016    Procedure: CYSTOSCOPY RETROGRADE PYELOGRAM WITH INSERTION STENT URETERAL;  Surgeon: Lolis Cruz MD;  Location: [address] [place] [address];  Service: Urology    TUBAL LIGATION  2010    URETERAL STENT PLACEMENT Right 06/30/2010    Pregnant    URETERAL STENT PLACEMENT Right 09/17/2010    Replaced still pregnant    URETERAL STENT PLACEMENT Right 11/05/2010    Still Pregnant     Family History   Problem Relation Age of Onset    Cancer Mother     Kidney disease Mother     Cirrhosis Mother         Hepatic    Cancer Maternal Grandmother     Diabetes Maternal Grandmother     Kidney disease Maternal Grandmother      Current Outpatient Medications on File Prior to Visit   Medication Sig Dispense Refill    buPROPion (Wellbutrin XL) 150 mg 24 hr tablet Take 1 tablet (150 mg total) by mouth every morning 30 tablet 0    [DISCONTINUED] diphenhydrAMINE (BENADRYL) 2 % cream Apply topically 3 (three) times a day as needed for itching (Patient not taking: Reported on 6/6/2022) 30 g 0    [DISCONTINUED] fluticasone (FLONASE) 50 mcg/act nasal spray 1 spray into each nostril daily (Patient not taking: Reported on 6/6/2022) 18 2 mL 0     No current facility-administered medications on file prior to visit  Physical exam:   Temp (!) 97 4 °F (36 3 °C) (Temporal)   Ht 5' 4" (1 626 m)   Wt 81 2 kg (179 lb)   LMP 06/01/2022 (Approximate)   BMI 30 73 kg/m²   Head: Atraumatic, no visible scalp lesions, parotid and submandibular salivary glands non-tender to palpation and without masses bilaterally  Neck:  No visible or palpable cervical lesions or lymphadenopathy, thyroid gland is normal in size and symmetry and without masses, normal laryngeal elevation with swallowing     Ears:    Right ear :  Auricle normal in appearance, mastoid prominence non-tender, external auditory canal clear  Tympanic membranes intact  Impacted cerumen cleaned (look to the procedure notes)  Left ear :  Auricle normal in appearance, mastoid prominence non-tender, external auditory canal clear   Tympanic membranes intact  Impacted cerumen cleaned (look to the procedure notes)  Nose/Sinuses:  External appearance unremarkable, no maxillary or frontal sinus tenderness to palpation bilaterally  Anterior rhinoscopy reveals:   Oral Cavity:  Moist mucus membranes, gums and dentition unremarkable, no oral mucosal masses or lesions, floor of mouth soft, tongue mobile without masses or lesions  Oropharynx:  crowded oropharyngeal and oral cavity  Flexible laryngoscopy performed:  Fiberoptic scope advanced through left nare, findings:  Nasal cavity: Septum deviated to the left, no polyps or mucopus  Nasopharynx: unremarkable, no masses or lesions, eustachian tube orifi and Fossae of Rosenmuller unremakable  Oropharynx: mucosa moist, no masses or lesions, tongue base, lateral and posterior walls normal  Larynx: True vocal folds mobile, no masses or lesions, widely patent glottic chink, arytenoids erythema  Hypopharynx: Pyriform sinuses clear without masses or pooling  Post-cricoid area unremarkable  Eyes:  Extra-ocular movements intact, pupils equally round and reactive to light and accommodation, the lids and conjunctivae are normal in appearance  Constitutional:  Well developed, well nourished and groomed, in no acute distress  Cardiovascular:  Normal rate and rhythm, no palpable thrills, no jugulovenous distension observed  Respiratory:  Normal respiratory effort without evidence of retractions or use of accessory muscles  Neurologic:  Cranial nerves II-XII intact bilaterally  Abdomen: Soft and lax  Extremities: No bruises   Psychiatric:  Alert and oriented to time, place and person    Procedures  Flexible laryngoscopy performed: The nasal cavities were decongested with lidocaine and oxymetazoline spray  Endoscopy type: flexible  Results: look to the examination  The patient tolerated the procedure well  Microscopic ear examination with impacted cerumen removal:  Impacted cerumen removed from both ears using different types of instruments including curettes, suction and cup forceps)  Assessment:   1  Globus pharyngeus     2  Pharyngeal dysphagia  Ambulatory Referral to Otolaryngology   3  Laryngopharyngeal reflux (LPR)     4  Gastroesophageal reflux disease without esophagitis     5  Bilateral impacted cerumen       Orders  No orders of the defined types were placed in this encounter  Discussion/Plan:      Impacted cerumen  Follow up every 6 months for ear check  Use debrox ear drops once/week  GERD and Reflux laryngitis   Treatment strategies discussed included decreasing caffeine intake, discontinuing late night eating, sleeping with the head of the bed elevated  Also decrease coughing and throat-clearing behaviors  A proton pump inhibitor was prescribed  We will consider more aggressive acid suppression treatment or 24 hours PH monitoring if no improvement after medication use for more than 4 weeks           Snoring and SANTOS  Sleep study ordered

## 2022-06-06 NOTE — PATIENT INSTRUCTIONS
Wellness Visit for Adults   AMBULATORY CARE:   A wellness visit  is when you see your healthcare provider to get screened for health problems  Your healthcare provider will also give you advice on how to stay healthy  Write down your questions so you remember to ask them  Ask your healthcare provider how often you should have a wellness visit  What happens at a wellness visit:  Your healthcare provider will ask about your health, and your family history of health problems  This includes high blood pressure, heart disease, and cancer  He or she will ask if you have symptoms that concern you, if you smoke, and about your mood  You may also be asked about your intake of medicines, supplements, food, and alcohol  Any of the following may be done:  · Your weight  will be checked  Your height may also be checked so your body mass index (BMI) can be calculated  Your BMI shows if you are at a healthy weight  · Your blood pressure  and heart rate will be checked  Your temperature may also be checked  · Blood and urine tests  may be done  Blood tests may be done to check your cholesterol levels  Abnormal cholesterol levels increase your risk for heart disease and stroke  You may also need a blood or urine test to check for diabetes if you are at increased risk  Urine tests may be done to look for signs of an infection or kidney disease  · A physical exam  includes checking your heartbeat and lungs with a stethoscope  Your healthcare provider may also check your skin to look for sun damage  · Screening tests  may be recommended  A screening test is done to check for diseases that may not cause symptoms  The screening tests you may need depend on your age, gender, family history, and lifestyle habits  For example, colorectal screening may be recommended if you are 48years old or older  Screening tests you need if you are a woman:   · A Pap smear  is used to screen for cervical cancer   Pap smears are usually done every 3 to 5 years depending on your age  You may need them more often if you have had abnormal Pap smear test results in the past  Ask your healthcare provider how often you should have a Pap smear  · A mammogram  is an x-ray of your breasts to screen for breast cancer  Experts recommend mammograms every 2 years starting at age 48 years  You may need a mammogram at age 52 years or younger if you have an increased risk for breast cancer  Talk to your healthcare provider about when you should start having mammograms and how often you need them  Vaccines you may need:   · Get an influenza vaccine  every year  The influenza vaccine protects you from the flu  Several types of viruses cause the flu  The viruses change over time, so new vaccines are made each year  · Get a tetanus-diphtheria (Td) booster vaccine  every 10 years  This vaccine protects you against tetanus and diphtheria  Tetanus is a severe infection that may cause painful muscle spasms and lockjaw  Diphtheria is a severe bacterial infection that causes a thick covering in the back of your mouth and throat  · Get a human papillomavirus (HPV) vaccine  if you are female and aged 23 to 32 or male 23 to 24 and never received it  This vaccine protects you from HPV infection  HPV is the most common infection spread by sexual contact  HPV may also cause vaginal, penile, and anal cancers  · Get a pneumococcal vaccine  if you are aged 72 years or older  The pneumococcal vaccine is an injection given to protect you from pneumococcal disease  Pneumococcal disease is an infection caused by pneumococcal bacteria  The infection may cause pneumonia, meningitis, or an ear infection  · Get a shingles vaccine  if you are 60 or older, even if you have had shingles before  The shingles vaccine is an injection to protect you from the varicella-zoster virus  This is the same virus that causes chickenpox   Shingles is a painful rash that develops in people who had chickenpox or have been exposed to the virus  How to eat healthy:  My Plate is a model for planning healthy meals  It shows the types and amounts of foods that should go on your plate  Fruits and vegetables make up about half of your plate, and grains and protein make up the other half  A serving of dairy is included on the side of your plate  The amount of calories and serving sizes you need depends on your age, gender, weight, and height  Examples of healthy foods are listed below:  · Eat a variety of vegetables  such as dark green, red, and orange vegetables  You can also include canned vegetables low in sodium (salt) and frozen vegetables without added butter or sauces  · Eat a variety of fresh fruits , canned fruit in 100% juice, frozen fruit, and dried fruit  · Include whole grains  At least half of the grains you eat should be whole grains  Examples include whole-wheat bread, wheat pasta, brown rice, and whole-grain cereals such as oatmeal     · Eat a variety of protein foods such as seafood (fish and shellfish), lean meat, and poultry without skin (turkey and chicken)  Examples of lean meats include pork leg, shoulder, or tenderloin, and beef round, sirloin, tenderloin, and extra lean ground beef  Other protein foods include eggs and egg substitutes, beans, peas, soy products, nuts, and seeds  · Choose low-fat dairy products such as skim or 1% milk or low-fat yogurt, cheese, and cottage cheese  · Limit unhealthy fats  such as butter, hard margarine, and shortening  Exercise:  Exercise at least 30 minutes per day on most days of the week  Some examples of exercise include walking, biking, dancing, and swimming  You can also fit in more physical activity by taking the stairs instead of the elevator or parking farther away from stores  Include muscle strengthening activities 2 days each week  Regular exercise provides many health benefits   It helps you manage your weight, and decreases your risk for type 2 diabetes, heart disease, stroke, and high blood pressure  Exercise can also help improve your mood  Ask your healthcare provider about the best exercise plan for you  General health and safety guidelines:   · Do not smoke  Nicotine and other chemicals in cigarettes and cigars can cause lung damage  Ask your healthcare provider for information if you currently smoke and need help to quit  E-cigarettes or smokeless tobacco still contain nicotine  Talk to your healthcare provider before you use these products  · Limit alcohol  A drink of alcohol is 12 ounces of beer, 5 ounces of wine, or 1½ ounces of liquor  · Lose weight, if needed  Being overweight increases your risk of certain health conditions  These include heart disease, high blood pressure, type 2 diabetes, and certain types of cancer  · Protect your skin  Do not sunbathe or use tanning beds  Use sunscreen with a SPF 15 or higher  Apply sunscreen at least 15 minutes before you go outside  Reapply sunscreen every 2 hours  Wear protective clothing, hats, and sunglasses when you are outside  · Drive safely  Always wear your seatbelt  Make sure everyone in your car wears a seatbelt  A seatbelt can save your life if you are in an accident  Do not use your cell phone when you are driving  This could distract you and cause an accident  Pull over if you need to make a call or send a text message  · Practice safe sex  Use latex condoms if are sexually active and have more than one partner  Your healthcare provider may recommend screening tests for sexually transmitted infections (STIs)  · Wear helmets, lifejackets, and protective gear  Always wear a helmet when you ride a bike or motorcycle, go skiing, or play sports that could cause a head injury  Wear protective equipment when you play sports  Wear a lifejacket when you are on a boat or doing water sports      © Copyright [employer] 2022 Information is for End User's use only and may not be sold, redistributed or otherwise used for commercial purposes  All illustrations and images included in CareNotes® are the copyrighted property of [employer] D A M [employer] Inc  or Sophia [employer]  The above information is an  only  It is not intended as medical advice for individual conditions or treatments  Talk to your doctor, nurse or pharmacist before following any medical regimen to see if it is safe and effective for you  Wellness Visit for Adults   AMBULATORY CARE:   A wellness visit  is when you see your healthcare provider to get screened for health problems  Your healthcare provider will also give you advice on how to stay healthy  Write down your questions so you remember to ask them  Ask your healthcare provider how often you should have a wellness visit  What happens at a wellness visit:  Your healthcare provider will ask about your health, and your family history of health problems  This includes high blood pressure, heart disease, and cancer  He or she will ask if you have symptoms that concern you, if you smoke, and about your mood  You may also be asked about your intake of medicines, supplements, food, and alcohol  Any of the following may be done:  · Your weight  will be checked  Your height may also be checked so your body mass index (BMI) can be calculated  Your BMI shows if you are at a healthy weight  · Your blood pressure  and heart rate will be checked  Your temperature may also be checked  · Blood and urine tests  may be done  Blood tests may be done to check your cholesterol levels  Abnormal cholesterol levels increase your risk for heart disease and stroke  You may also need a blood or urine test to check for diabetes if you are at increased risk  Urine tests may be done to look for signs of an infection or kidney disease  · A physical exam  includes checking your heartbeat and lungs with a stethoscope   Your healthcare provider may also check your skin to look for sun damage  · Screening tests  may be recommended  A screening test is done to check for diseases that may not cause symptoms  The screening tests you may need depend on your age, gender, family history, and lifestyle habits  For example, colorectal screening may be recommended if you are 48years old or older  Screening tests you need if you are a woman:   · A Pap smear  is used to screen for cervical cancer  Pap smears are usually done every 3 to 5 years depending on your age  You may need them more often if you have had abnormal Pap smear test results in the past  Ask your healthcare provider how often you should have a Pap smear  · A mammogram  is an x-ray of your breasts to screen for breast cancer  Experts recommend mammograms every 2 years starting at age 48 years  You may need a mammogram at age 52 years or younger if you have an increased risk for breast cancer  Talk to your healthcare provider about when you should start having mammograms and how often you need them  Vaccines you may need:   · Get an influenza vaccine  every year  The influenza vaccine protects you from the flu  Several types of viruses cause the flu  The viruses change over time, so new vaccines are made each year  · Get a tetanus-diphtheria (Td) booster vaccine  every 10 years  This vaccine protects you against tetanus and diphtheria  Tetanus is a severe infection that may cause painful muscle spasms and lockjaw  Diphtheria is a severe bacterial infection that causes a thick covering in the back of your mouth and throat  · Get a human papillomavirus (HPV) vaccine  if you are female and aged 23 to 32 or male 23 to 24 and never received it  This vaccine protects you from HPV infection  HPV is the most common infection spread by sexual contact  HPV may also cause vaginal, penile, and anal cancers  · Get a pneumococcal vaccine  if you are aged 72 years or older   The pneumococcal vaccine is an injection given to protect you from pneumococcal disease  Pneumococcal disease is an infection caused by pneumococcal bacteria  The infection may cause pneumonia, meningitis, or an ear infection  · Get a shingles vaccine  if you are 60 or older, even if you have had shingles before  The shingles vaccine is an injection to protect you from the varicella-zoster virus  This is the same virus that causes chickenpox  Shingles is a painful rash that develops in people who had chickenpox or have been exposed to the virus  How to eat healthy:  My Plate is a model for planning healthy meals  It shows the types and amounts of foods that should go on your plate  Fruits and vegetables make up about half of your plate, and grains and protein make up the other half  A serving of dairy is included on the side of your plate  The amount of calories and serving sizes you need depends on your age, gender, weight, and height  Examples of healthy foods are listed below:  · Eat a variety of vegetables  such as dark green, red, and orange vegetables  You can also include canned vegetables low in sodium (salt) and frozen vegetables without added butter or sauces  · Eat a variety of fresh fruits , canned fruit in 100% juice, frozen fruit, and dried fruit  · Include whole grains  At least half of the grains you eat should be whole grains  Examples include whole-wheat bread, wheat pasta, brown rice, and whole-grain cereals such as oatmeal     · Eat a variety of protein foods such as seafood (fish and shellfish), lean meat, and poultry without skin (turkey and chicken)  Examples of lean meats include pork leg, shoulder, or tenderloin, and beef round, sirloin, tenderloin, and extra lean ground beef  Other protein foods include eggs and egg substitutes, beans, peas, soy products, nuts, and seeds  · Choose low-fat dairy products such as skim or 1% milk or low-fat yogurt, cheese, and cottage cheese      · Limit unhealthy fats such as butter, hard margarine, and shortening  Exercise:  Exercise at least 30 minutes per day on most days of the week  Some examples of exercise include walking, biking, dancing, and swimming  You can also fit in more physical activity by taking the stairs instead of the elevator or parking farther away from stores  Include muscle strengthening activities 2 days each week  Regular exercise provides many health benefits  It helps you manage your weight, and decreases your risk for type 2 diabetes, heart disease, stroke, and high blood pressure  Exercise can also help improve your mood  Ask your healthcare provider about the best exercise plan for you  General health and safety guidelines:   · Do not smoke  Nicotine and other chemicals in cigarettes and cigars can cause lung damage  Ask your healthcare provider for information if you currently smoke and need help to quit  E-cigarettes or smokeless tobacco still contain nicotine  Talk to your healthcare provider before you use these products  · Limit alcohol  A drink of alcohol is 12 ounces of beer, 5 ounces of wine, or 1½ ounces of liquor  · Lose weight, if needed  Being overweight increases your risk of certain health conditions  These include heart disease, high blood pressure, type 2 diabetes, and certain types of cancer  · Protect your skin  Do not sunbathe or use tanning beds  Use sunscreen with a SPF 15 or higher  Apply sunscreen at least 15 minutes before you go outside  Reapply sunscreen every 2 hours  Wear protective clothing, hats, and sunglasses when you are outside  · Drive safely  Always wear your seatbelt  Make sure everyone in your car wears a seatbelt  A seatbelt can save your life if you are in an accident  Do not use your cell phone when you are driving  This could distract you and cause an accident  Pull over if you need to make a call or send a text message  · Practice safe sex    Use latex condoms if are sexually active and have more than one partner  Your healthcare provider may recommend screening tests for sexually transmitted infections (STIs)  · Wear helmets, lifejackets, and protective gear  Always wear a helmet when you ride a bike or motorcycle, go skiing, or play sports that could cause a head injury  Wear protective equipment when you play sports  Wear a lifejacket when you are on a boat or doing water sports  © Copyright [employer] 2022 Information is for End User's use only and may not be sold, redistributed or otherwise used for commercial purposes  All illustrations and images included in CareNotes® are the copyrighted property of [employer] D A M , Inc  or [employer]   The above information is an  only  It is not intended as medical advice for individual conditions or treatments  Talk to your doctor, nurse or pharmacist before following any medical regimen to see if it is safe and effective for you  Cigarette Smoking and Your Health   AMBULATORY CARE:   Risks to your health if you smoke:  Nicotine and other chemicals found in tobacco and e-cigarettes can damage every cell in your body  Even if you are a light smoker, you have an increased risk for cancer, heart disease, and lung disease  If you are pregnant or have diabetes, smoking increases your risk for complications  Nicotine can affect an adolescent's developing brain  This can lead to trouble thinking, learning, or paying attention  Benefits to your health if you stop smoking:   · You decrease respiratory symptoms such as coughing, wheezing, and shortness of breath  · You reduce your risk for cancers of the lung, mouth, throat, kidney, bladder, pancreas, stomach, and cervix  If you already have cancer, you increase the benefits of chemotherapy  You also reduce your risk for cancer returning or a second cancer from developing  · You reduce your risk for heart disease, blood clots, heart attack, and stroke      · You reduce your risk for lung infections, and diseases such as pneumonia, asthma, chronic bronchitis, and emphysema  · Your circulation improves  More oxygen can be delivered to your body  If you have diabetes, you lower your risk for complications, such as kidney, artery, and eye diseases  You also lower your risk for nerve damage  Nerve damage can lead to amputations, poor vision, and blindness  · You improve your body's ability to heal and to fight infections  · An adolescent can help his or her brain and body develop in a healthy way  Talk to your adolescent about all the health risks of nicotine  If you can, start talking about nicotine when your child is younger than 12 years  This may make it easier for him or her not to start using nicotine as a teenager or adult  Explain to him or her that it is best never to start  It can be hard to try to quit later  Benefits to the health of others if you stop smoking:  Tobacco is harmful to nonsmokers who breathe in your secondhand smoke  The following are ways the health of others around you may improve when you stop smoking:  · You lower the risks for lung cancer and heart disease in nonsmoking adults  · If you are pregnant, you lower the risk for miscarriage, early delivery, low birth weight, and stillbirth  You also lower your baby's risk for SIDS, obesity, developmental delay, and neurobehavioral problems, such as ADHD  · If you have children, you lower their risk for ear infections, colds, pneumonia, bronchitis, and asthma  Follow up with your doctor as directed:  Write down your questions so you remember to ask them during your visits  For support and more information:   · American [employer]  [address] [facility]  [address]  Phone: [facility] [address]  Phone: 6- 689 - 023-1141  Web Address: [employer] davis    · Smokefree  gov  Phone: 2- 875 - 918-6670  Web Address: www smokefree    [address] 2022 Information is for End User's use only and may not be sold, redistributed or otherwise used for commercial purposes  All illustrations and images included in CareNotes® are the copyrighted property of [employer]  or [employer]  The above information is an  only  It is not intended as medical advice for individual conditions or treatments  Talk to your doctor, nurse or pharmacist before following any medical regimen to see if it is safe and effective for you

## 2022-06-06 NOTE — PROGRESS NOTES
[address] [facility]    NAME: Arnie Romo  AGE: 43 y o  SEX: female  : 1985     DATE: 2022     Assessment and Plan:     Problem List Items Addressed This Visit    None     Visit Diagnoses     Annual physical exam    -  Primary    Other tobacco product nicotine dependence, uncomplicated        Relevant Medications    buPROPion (Wellbutrin XL) 150 mg 24 hr tablet    Moderate major depression (HCC)        Relevant Medications    buPROPion (Wellbutrin XL) 150 mg 24 hr tablet        Routine lab work obtained prior to visit  All normal     Given patient's anxiety and depression along with her nicotine dependence (vapes nicotine daily) I believe that daily Wellbutrin would be greatly beneficial for her  PHQ-9 elevated to 10 indicating moderate depression  Patient does not have a history of seizures or eating disorders  Patient to return in 6 weeks for re-evaluation  Patient continues to have difficulty swallowing with the feeling that something is in her throat  She has an upcoming appointment with ENT  Patient has history of hydronephrosis and renal colic  Patient was given a urology referral at her last appointment and has yet to make an appointment with them  Immunizations and preventive care screenings were discussed with patient today  Appropriate education was printed on patient's after visit summary  Counseling:  · Exercise: the importance of regular exercise/physical activity was discussed  Recommend exercise 3-5 times per week for at least 30 minutes  BMI Counseling: Body mass index is 30 73 kg/m²  The BMI is above normal  Nutrition recommendations include decreasing fast food intake, consuming healthier snacks and limiting drinks that contain sugar   Exercise recommendations include moderate physical activity 150 minutes/week, vigorous physical activity 75 minutes/week, exercising 3-5 times per week and strength training exercises  Rationale for BMI follow-up plan is due to patient being overweight or obese  Return for due for pap smear and HPV testing  Last pap was 2016 - negative      Chief Complaint:     Chief Complaint   Patient presents with    Physical Exam      History of Present Illness:     Adult Annual Physical   Patient here for a comprehensive physical exam  The patient reports problems - Depression and anxiety  Patient states that this has been ongoing for many years  She vapes nicotine to help her cope  No suicidal or homicidal ideations  No history of self-harm       Diet and Physical Activity  · Diet/Nutrition: consuming 3-5 servings of fruits/vegetables daily  · Exercise: walking and Works as a   Depression Screening  PHQ-2/9 Depression Screening    Little interest or pleasure in doing things: 0 - not at all  Feeling down, depressed, or hopeless: 2 - more than half the days  Trouble falling or staying asleep, or sleeping too much: 2 - more than half the days  Feeling tired or having little energy: 3 - nearly every day  Poor appetite or overeatin - several days  Feeling bad about yourself - or that you are a failure or have let yourself or your family down: 1 - several days  Trouble concentrating on things, such as reading the newspaper or watching television: 1 - several days  Moving or speaking so slowly that other people could have noticed  Or the opposite - being so fidgety or restless that you have been moving around a lot more than usual: 0 - not at all  Thoughts that you would be better off dead, or of hurting yourself in some way: 0 - not at all  PHQ-9 Score: 10   PHQ-9 Interpretation: Moderate depression        General Health  · Sleep: sleeps poorly  Has trouble falling asleep  · Hearing: normal - bilateral   · Vision: no vision problems  · Dental: regular dental visits         /GYN Health  · Last menstrual period: 2022  · Contraceptive method: s/p ligation       Review of Systems:     Review of Systems   Constitutional: Positive for fatigue  Negative for chills and fever  HENT: Positive for trouble swallowing  Eyes: Negative for visual disturbance  Respiratory: Negative for cough and shortness of breath  Gastrointestinal: Negative for abdominal pain, diarrhea, nausea and vomiting  Genitourinary: Negative for difficulty urinating and dysuria  Neurological: Negative for dizziness and headaches  Psychiatric/Behavioral: Positive for sleep disturbance (Difficulty falling asleep)  Negative for dysphoric mood, self-injury and suicidal ideas  The patient is not nervous/anxious  Past Medical History:     Past Medical History:   Diagnosis Date    Anxiety     Depression     Epicondylitis, lateral, right 2018    Gallstones     Hypertension     With pregnancy    Kidney stones     Kidney stones     bilateral    Urinary tract infection       Past Surgical History:     Past Surgical History:   Procedure Laterality Date     SECTION       &     CHOLECYSTECTOMY      lap    EXTRACORPOREAL SHOCK WAVE LITHOTRIPSY Right 12/10/2010    15mm stone    EXTRACORPOREAL SHOCK WAVE LITHOTRIPSY Right 2011    5mm stone  Did not have f/u x-ray after [facility]  Sent reminder and slip  Negligent patient      INDUCED       OK CYSTO/URETERO W/LITHOTRIPSY &INDWELL STENT INSRT Right 2016    Procedure: CYSTOSCOPY, RETROGRADE PYELOGRAM,URETEROSCOPY WITH HOLMIUM LASER LITHOTRIPSY,STONE BASKET EXTRACTION,  AND INSERTION URETERAL STENT;  Surgeon: Ayaka Garcia MD;  Location: [address] [place] [address];  Service: Urology    OK CYSTOURETHROSCOPY,URETER CATHETER Right 2016    Procedure: CYSTOSCOPY RETROGRADE PYELOGRAM WITH INSERTION STENT URETERAL;  Surgeon: Ayaka Garcia MD;  Location: [address] [place] [address];  Service: Urology    TUBAL LIGATION  2010    URETERAL STENT PLACEMENT Right 2010    Pregnant    URETERAL STENT PLACEMENT Right 2010    Replaced still pregnant    URETERAL STENT PLACEMENT Right 2010    Still Pregnant      Social History:     Social History     Socioeconomic History    Marital status: Single     Spouse name: None    Number of children: None    Years of education: None    Highest education level: None   Occupational History    None   Tobacco Use    Smoking status: Former Smoker     Packs/day: 0 25     Years: 15 00     Pack years: 3 75     Quit date:      Years since quittin 4    Smokeless tobacco: Never Used    Tobacco comment: vapes   Vaping Use    Vaping Use: Every day    Substances: Nicotine   Substance and Sexual Activity    Alcohol use: Yes     Comment: rare    Drug use: Yes     Comment: MMP - medical marijuana    Sexual activity: Yes     Partners: Male   Other Topics Concern    None   Social History Narrative    Always uses seatbelts     Social Determinants of Health     Financial Resource Strain: Not on file   Food Insecurity: Not on file   Transportation Needs: Not on file   Physical Activity: Not on file   Stress: Not on file   Social Connections: Not on file   Intimate Partner Violence: Not on file   Housing Stability: Not on file      Family History:     Family History   Problem Relation Age of Onset    Cancer Mother     Kidney disease Mother     Cirrhosis Mother         Hepatic    Cancer Maternal Grandmother     Diabetes Maternal Grandmother     Kidney disease Maternal Grandmother       Current Medications:     Current Outpatient Medications   Medication Sig Dispense Refill    buPROPion (Wellbutrin XL) 150 mg 24 hr tablet Take 1 tablet (150 mg total) by mouth every morning 30 tablet 0     No current facility-administered medications for this visit        Allergies:     No Known Allergies   Physical Exam:     /78 (BP Location: Left arm, Patient Position: Sitting, Cuff Size: Adult)   Pulse 72   Temp (!) 97 4 °F (36 3 °C) (Tympanic)   Resp 18   Ht 5' 4" (1 626 m) Wt 81 2 kg (179 lb)   LMP 06/01/2022 (Approximate)   SpO2 99%   BMI 30 73 kg/m²     Physical Exam  HENT:      Head: Normocephalic and atraumatic  Right Ear: Tympanic membrane and ear canal normal  There is impacted cerumen  Left Ear: Tympanic membrane and ear canal normal  There is impacted cerumen  Nose: Nose normal       Mouth/Throat:      Mouth: Mucous membranes are moist    Cardiovascular:      Rate and Rhythm: Normal rate and regular rhythm  Pulses: Normal pulses  Pulmonary:      Effort: Pulmonary effort is normal  No respiratory distress  Breath sounds: No wheezing or rhonchi  Abdominal:      General: Abdomen is flat  There is no distension  Tenderness: There is no abdominal tenderness  There is no right CVA tenderness, left CVA tenderness or guarding  Skin:     General: Skin is warm and dry  Neurological:      General: No focal deficit present  Mental Status: She is alert and oriented to person, place, and time        Gait: Gait normal    Psychiatric:      Comments: Tearful          Margarita Jo, DO   [address]

## 2022-06-22 ENCOUNTER — OFFICE VISIT (OUTPATIENT)
Dept: FAMILY MEDICINE CLINIC | Facility: CLINIC | Age: 37
End: 2022-06-22
Payer: COMMERCIAL

## 2022-06-22 VITALS
WEIGHT: 176 LBS | TEMPERATURE: 97.3 F | RESPIRATION RATE: 18 BRPM | HEIGHT: 64 IN | DIASTOLIC BLOOD PRESSURE: 78 MMHG | HEART RATE: 81 BPM | OXYGEN SATURATION: 98 % | BODY MASS INDEX: 30.05 KG/M2 | SYSTOLIC BLOOD PRESSURE: 120 MMHG

## 2022-06-22 DIAGNOSIS — J02.9 SORE THROAT: Primary | ICD-10-CM

## 2022-06-22 DIAGNOSIS — J35.1 ENLARGED TONSILS: ICD-10-CM

## 2022-06-22 DIAGNOSIS — R19.7 DIARRHEA IN ADULT PATIENT: ICD-10-CM

## 2022-06-22 LAB — S PYO AG THROAT QL: NEGATIVE

## 2022-06-22 PROCEDURE — 99213 OFFICE O/P EST LOW 20 MIN: CPT | Performed by: NURSE PRACTITIONER

## 2022-06-22 PROCEDURE — 87880 STREP A ASSAY W/OPTIC: CPT | Performed by: NURSE PRACTITIONER

## 2022-06-22 PROCEDURE — 1036F TOBACCO NON-USER: CPT | Performed by: NURSE PRACTITIONER

## 2022-06-22 RX ORDER — AMOXICILLIN 875 MG/1
875 TABLET, COATED ORAL 2 TIMES DAILY
Qty: 20 TABLET | Refills: 0 | Status: SHIPPED | OUTPATIENT
Start: 2022-06-22 | End: 2022-07-02

## 2022-06-22 NOTE — PROGRESS NOTES
Assessment/Plan:    Sore Throat-  Augmentin for sore throat for 10 days--Neg for strep A    Inflamed tonsils- noted and rec f/up with ENT prior to Novemeber    Diarrhea-  rec OTC immodium or similar product and probiotic    No problem-specific Assessment & Plan notes found for this encounter  Diagnoses and all orders for this visit:    Sore throat  -     POCT rapid strepA  -     Throat culture          Subjective:      Patient ID: Brandie Valle is a 43 y o  female  C/o sore throat-dry cough-gassy with diarrhea every day-difficulty swallowing-runny nose  Started Sunday the 19th  Came in to office a few weeks ago and was followed up by ENT and scoped  The following portions of the patient's history were reviewed and updated as appropriate: allergies, current medications and problem list     Review of Systems   Constitutional: Positive for fatigue  HENT: Positive for congestion, postnasal drip, sneezing and trouble swallowing  Eyes: Negative  Respiratory: Negative  Cardiovascular: Negative  Gastrointestinal: Positive for diarrhea  Endocrine: Negative  Genitourinary: Negative  Musculoskeletal: Negative  Skin: Negative  Neurological: Negative  Hematological: Negative  Psychiatric/Behavioral: Negative  Objective:      /78   Pulse 81   Temp (!) 97 3 °F (36 3 °C) (Tympanic)   Resp 18   Ht 5' 4" (1 626 m)   Wt 79 8 kg (176 lb)   LMP 06/01/2022 (Approximate)   SpO2 98%   BMI 30 21 kg/m²          Physical Exam  Vitals and nursing note reviewed  Constitutional:       Appearance: She is well-developed  HENT:      Right Ear: Tympanic membrane normal       Left Ear: Tympanic membrane normal       Nose: Congestion present  Mouth/Throat:      Mouth: Mucous membranes are moist       Pharynx: Uvula midline  Posterior oropharyngeal erythema and uvula swelling present  Tonsils: 1+ on the right  1+ on the left     Eyes:      Pupils: Pupils are equal, round, and reactive to light  Cardiovascular:      Rate and Rhythm: Normal rate and regular rhythm  Heart sounds: Normal heart sounds  Pulmonary:      Breath sounds: Normal breath sounds  Comments: cough  Abdominal:      General: Bowel sounds are normal       Palpations: Abdomen is soft  Comments: diarrhea   Musculoskeletal:      Cervical back: Normal range of motion and neck supple  Skin:     General: Skin is warm and dry  Neurological:      General: No focal deficit present  Mental Status: She is alert and oriented to person, place, and time     Psychiatric:         Mood and Affect: Mood normal          Behavior: Behavior normal

## 2022-06-25 LAB — B-HEM STREP SPEC QL CULT: NEGATIVE

## 2022-06-30 ENCOUNTER — OFFICE VISIT (OUTPATIENT)
Dept: OTOLARYNGOLOGY | Facility: CLINIC | Age: 37
End: 2022-06-30
Payer: COMMERCIAL

## 2022-06-30 VITALS — TEMPERATURE: 97.5 F | HEIGHT: 64 IN | BODY MASS INDEX: 30.05 KG/M2 | WEIGHT: 176 LBS

## 2022-06-30 DIAGNOSIS — R09.89 GLOBUS PHARYNGEUS: Primary | ICD-10-CM

## 2022-06-30 PROCEDURE — 99213 OFFICE O/P EST LOW 20 MIN: CPT | Performed by: OTOLARYNGOLOGY

## 2022-06-30 PROCEDURE — 3008F BODY MASS INDEX DOCD: CPT | Performed by: NURSE PRACTITIONER

## 2022-06-30 NOTE — PROGRESS NOTES
Assessment/Plan:  Recent tonsillitis appears to be responding to her current course of Augmentin  Pt encouraged to take the pantoprazole for her globus sensation, which is most likely due to LPR  F/u 1 month  No problem-specific Assessment & Plan notes found for this encounter  Diagnoses and all orders for this visit:    Globus pharyngeus          Subjective:      Patient ID: Yeyo Myers is a 43 y o  female  Pt presents for f/u of sore throat  She is feeling much better, with respect to her sore throat,   She is on antibiotics for her recent sore throat  She has not been taking the pantoprazole  The following portions of the patient's history were reviewed and updated as appropriate: allergies, current medications, past family history, past medical history, past social history, past surgical history and problem list     Review of Systems      Objective:      Temp 97 5 °F (36 4 °C) (Temporal)   Ht 5' 4" (1 626 m)   Wt 79 8 kg (176 lb)   LMP 06/01/2022 (Approximate)   BMI 30 21 kg/m²          Physical Exam  Constitutional:       Appearance: She is well-developed  HENT:      Head: Normocephalic and atraumatic  Right Ear: Tympanic membrane, ear canal and external ear normal  No drainage  No middle ear effusion  Left Ear: Tympanic membrane, ear canal and external ear normal  No drainage  No middle ear effusion  Nose: Septal deviation present  Mouth/Throat:      Pharynx: Uvula midline  No oropharyngeal exudate  Tonsils: 2+ on the right  2+ on the left  Neck:      Thyroid: No thyroid mass or thyromegaly  Trachea: Trachea normal  No tracheal deviation  Lymphadenopathy:      Cervical: No cervical adenopathy  Neurological:      Mental Status: She is alert  Pt tolerated her lunch well , no C/O N/V . MD notified and she said pt is ready for discharge . Will complete the discharge process .

## 2022-07-13 ENCOUNTER — HOSPITAL ENCOUNTER (EMERGENCY)
Facility: HOSPITAL | Age: 37
Discharge: HOME | End: 2022-07-13
Attending: EMERGENCY MEDICINE | Admitting: EMERGENCY MEDICINE
Payer: COMMERCIAL

## 2022-07-13 VITALS
RESPIRATION RATE: 22 BRPM | TEMPERATURE: 100.7 F | SYSTOLIC BLOOD PRESSURE: 145 MMHG | OXYGEN SATURATION: 99 % | HEART RATE: 87 BPM | DIASTOLIC BLOOD PRESSURE: 65 MMHG

## 2022-07-13 DIAGNOSIS — A59.9 TRICHOMONAS VAGINALIS INFECTION: ICD-10-CM

## 2022-07-13 DIAGNOSIS — U07.1 COVID: Primary | ICD-10-CM

## 2022-07-13 LAB
BACTERIA UR QL AUTO: ABNORMAL /HPF
BILIRUB UR QL STRIP: NEGATIVE
CLARITY UR: CLEAR
COLOR UR: ABNORMAL
GLUCOSE UR STRIP-MCNC: NEGATIVE MG/DL
HGB UR QL STRIP.AUTO: NEGATIVE
KETONES UR STRIP-MCNC: NEGATIVE MG/DL
LEUKOCYTE ESTERASE UR QL STRIP: ABNORMAL
NITRITE UR QL STRIP: NEGATIVE
NON-SQ EPI CELLS URNS QL MICRO: ABNORMAL /HPF
OTHER STN SPEC: ABNORMAL
PH UR STRIP.AUTO: 6 [PH]
PROT UR STRIP-MCNC: NEGATIVE MG/DL
RBC #/AREA URNS AUTO: ABNORMAL /HPF
SARS-COV-2 RNA RESP QL NAA+PROBE: POSITIVE
SP GR UR STRIP.AUTO: <=1.005 (ref 1–1.03)
UROBILINOGEN UR QL STRIP.AUTO: 0.2 E.U./DL
WBC #/AREA URNS AUTO: ABNORMAL /HPF

## 2022-07-13 PROCEDURE — U0005 INFEC AGEN DETEC AMPLI PROBE: HCPCS | Performed by: EMERGENCY MEDICINE

## 2022-07-13 PROCEDURE — 99283 EMERGENCY DEPT VISIT LOW MDM: CPT

## 2022-07-13 PROCEDURE — 99284 EMERGENCY DEPT VISIT MOD MDM: CPT | Performed by: EMERGENCY MEDICINE

## 2022-07-13 PROCEDURE — U0003 INFECTIOUS AGENT DETECTION BY NUCLEIC ACID (DNA OR RNA); SEVERE ACUTE RESPIRATORY SYNDROME CORONAVIRUS 2 (SARS-COV-2) (CORONAVIRUS DISEASE [COVID-19]), AMPLIFIED PROBE TECHNIQUE, MAKING USE OF HIGH THROUGHPUT TECHNOLOGIES AS DESCRIBED BY CMS-2020-01-R: HCPCS | Performed by: EMERGENCY MEDICINE

## 2022-07-13 PROCEDURE — 81001 URINALYSIS AUTO W/SCOPE: CPT | Performed by: EMERGENCY MEDICINE

## 2022-07-13 PROCEDURE — 87086 URINE CULTURE/COLONY COUNT: CPT | Performed by: EMERGENCY MEDICINE

## 2022-07-13 RX ORDER — METRONIDAZOLE 500 MG/1
500 TABLET ORAL ONCE
Status: COMPLETED | OUTPATIENT
Start: 2022-07-13 | End: 2022-07-13

## 2022-07-13 RX ORDER — ACETAMINOPHEN 325 MG/1
650 TABLET ORAL ONCE
Status: COMPLETED | OUTPATIENT
Start: 2022-07-13 | End: 2022-07-13

## 2022-07-13 RX ORDER — METRONIDAZOLE 500 MG/1
500 TABLET ORAL EVERY 12 HOURS SCHEDULED
Qty: 14 TABLET | Refills: 0 | Status: SHIPPED | OUTPATIENT
Start: 2022-07-13 | End: 2022-07-20

## 2022-07-13 RX ORDER — IBUPROFEN 600 MG/1
600 TABLET ORAL ONCE
Status: COMPLETED | OUTPATIENT
Start: 2022-07-13 | End: 2022-07-13

## 2022-07-13 RX ADMIN — METRONIDAZOLE 500 MG: 500 TABLET ORAL at 20:45

## 2022-07-13 RX ADMIN — ACETAMINOPHEN 650 MG: 325 TABLET ORAL at 19:32

## 2022-07-13 RX ADMIN — IBUPROFEN 600 MG: 600 TABLET, FILM COATED ORAL at 19:31

## 2022-07-13 NOTE — Clinical Note
Olivia Gray was seen and treated in our emergency department on 7/13/2022  Diagnosis:     Benjamin Asencio  may return to work on return date  She may return on this date: 07/23/2022         If you have any questions or concerns, please don't hesitate to call        Pauline Stephen DO    ______________________________           _______________          _______________  Hospital Representative                              Date                                Time

## 2022-07-13 NOTE — Clinical Note
Kaylan Shah was seen and treated in our emergency department on 7/13/2022  Diagnosis:     Jolanta Lang  may return to work on return date  She may return on this date: 07/23/2022         If you have any questions or concerns, please don't hesitate to call        Albin Acharya DO    ______________________________           _______________          _______________  Hospital Representative                              Date                                Time

## 2022-07-14 LAB — BACTERIA UR CULT: NORMAL

## 2022-07-14 NOTE — ED PROVIDER NOTES
History  Chief Complaint   Patient presents with    Generalized Body Aches     Started with headache a couple of days ago  Followed by back pain and then now everything hurts, now has fever, had two tylenol at 330     58-year-old female presents with generalized body aches states she started with headache fever couple days ago now has back pain and diffuse myalgias  Patient took 2 Tylenol at 1630 hours  Patient states she took a rapid antigen COVID test at home and there were 2 lines on the test   Patient wants to make sure that nothing else is going on but feels that she might have COVID  History provided by:  Patient   used: No        Prior to Admission Medications   Prescriptions Last Dose Informant Patient Reported? Taking? buPROPion (Wellbutrin XL) 150 mg 24 hr tablet   No No   Sig: Take 1 tablet (150 mg total) by mouth every morning   pantoprazole (PROTONIX) 40 mg tablet   No No   Sig: Take 1 tablet (40 mg total) by mouth daily      Facility-Administered Medications: None       Past Medical History:   Diagnosis Date    Anxiety     Depression     Epicondylitis, lateral, right 2018    Gallstones     Hypertension     With pregnancy    Kidney stones     Kidney stones     bilateral    Urinary tract infection        Past Surgical History:   Procedure Laterality Date     SECTION       &     CHOLECYSTECTOMY      lap    EXTRACORPOREAL SHOCK WAVE LITHOTRIPSY Right 12/10/2010    15mm stone    EXTRACORPOREAL SHOCK WAVE LITHOTRIPSY Right 2011    5mm stone  Did not have f/u x-ray after ESW[facility]  Sent reminder and slip  Negligent patient      INDUCED       TX CYSTO/URETERO W/LITHOTRIPSY &INDWELL STENT INSRT Right 2016    Procedure: CYSTOSCOPY, RETROGRADE PYELOGRAM,URETEROSCOPY WITH HOLMIUM LASER LITHOTRIPSY,STONE BASKET EXTRACTION,  AND INSERTION URETERAL STENT;  Surgeon: Aurea Peterson MD;  Location: [address] [place] [address];  Service: Urology  CO CYSTOURETHROSCOPY,URETER CATHETER Right 2016    Procedure: CYSTOSCOPY RETROGRADE PYELOGRAM WITH INSERTION STENT URETERAL;  Surgeon: Bassem Montalvo MD;  Location: [address] [place] [address];  Service: Urology    TUBAL LIGATION  2010    URETERAL STENT PLACEMENT Right 2010    Pregnant    URETERAL STENT PLACEMENT Right 2010    Replaced still pregnant    URETERAL STENT PLACEMENT Right 2010    Still Pregnant       Family History   Problem Relation Age of Onset    Cancer Mother     Kidney disease Mother     Cirrhosis Mother         Hepatic    Cancer Maternal Grandmother     Diabetes Maternal Grandmother     Kidney disease Maternal Grandmother      I have reviewed and agree with the history as documented  E-Cigarette/Vaping    E-Cigarette Use Current Every Day User      E-Cigarette/Vaping Substances    Nicotine Yes      Social History     Tobacco Use    Smoking status: Former Smoker     Packs/day: 0 25     Years: 15 00     Pack years: 3 75     Quit date:      Years since quittin 5    Smokeless tobacco: Never Used    Tobacco comment: vapes   Vaping Use    Vaping Use: Every day    Substances: Nicotine   Substance Use Topics    Alcohol use: Yes     Comment: rare    Drug use: Yes     Comment: MMP - medical marijuana       Review of Systems   Constitutional: Positive for chills and fever  Negative for activity change and diaphoresis  HENT: Positive for congestion  Negative for ear pain, nosebleeds, sore throat, trouble swallowing and voice change  Eyes: Negative for pain, discharge and redness  Respiratory: Negative for apnea, cough, choking, shortness of breath, wheezing and stridor  Cardiovascular: Negative for chest pain and palpitations  Gastrointestinal: Negative for abdominal distention, abdominal pain, constipation, diarrhea, nausea and vomiting  Endocrine: Negative for polydipsia     Genitourinary: Negative for difficulty urinating, dysuria, flank pain, frequency, hematuria and urgency  Musculoskeletal: Positive for back pain and myalgias  Negative for gait problem, joint swelling, neck pain and neck stiffness  Skin: Negative for pallor and rash  Neurological: Positive for headaches  Negative for dizziness, tremors, syncope, speech difficulty, weakness and numbness  Hematological: Negative for adenopathy  Psychiatric/Behavioral: Negative for confusion, hallucinations, self-injury and suicidal ideas  The patient is not nervous/anxious  Physical Exam  Physical Exam  Vitals and nursing note reviewed  Constitutional:       General: She is not in acute distress  Appearance: She is well-developed  She is not diaphoretic  Comments: Patient does have diffuse myalgias well as fever and looks like she does not feel well  HENT:      Head: Normocephalic and atraumatic  Right Ear: External ear normal       Left Ear: External ear normal       Nose: Nose normal    Eyes:      Conjunctiva/sclera: Conjunctivae normal       Pupils: Pupils are equal, round, and reactive to light  Cardiovascular:      Rate and Rhythm: Normal rate and regular rhythm  Heart sounds: Normal heart sounds  Pulmonary:      Effort: Pulmonary effort is normal       Breath sounds: Normal breath sounds  Abdominal:      General: Bowel sounds are normal       Palpations: Abdomen is soft  Musculoskeletal:         General: Normal range of motion  Cervical back: Normal range of motion and neck supple  Skin:     General: Skin is warm and dry  Neurological:      Mental Status: She is alert and oriented to person, place, and time  Deep Tendon Reflexes: Reflexes are normal and symmetric  Psychiatric:         Behavior: Behavior is cooperative           Vital Signs  ED Triage Vitals [07/13/22 1845]   Temperature Pulse Respirations Blood Pressure SpO2   (!) 100 7 °F (38 2 °C) 87 22 145/65 99 %      Temp Source Heart Rate Source Patient Position - Orthostatic VS BP Location FiO2 (%)   Tympanic Monitor Sitting Right arm --      Pain Score       10 - Worst Possible Pain           Vitals:    07/13/22 1845   BP: 145/65   Pulse: 87   Patient Position - Orthostatic VS: Sitting         Visual Acuity      ED Medications  Medications   acetaminophen (TYLENOL) tablet 650 mg (650 mg Oral Given 7/13/22 1932)   ibuprofen (MOTRIN) tablet 600 mg (600 mg Oral Given 7/13/22 1931)   metroNIDAZOLE (FLAGYL) tablet 500 mg (500 mg Oral Given 7/13/22 2045)       Diagnostic Studies  Results Reviewed     Procedure Component Value Units Date/Time    COVID only [[id number]]  (Abnormal) Collected: 07/13/22 1933    Lab Status: Final result Specimen: Nares from Nasopharyngeal Swab Updated: 07/13/22 2015     SARS-CoV-2 Positive    Narrative:      FOR PEDIATRIC PATIENTS - copy/paste COVID Guidelines URL to browser: [email or web address]    SARS-CoV-2 assay is a Nucleic Acid Amplification assay intended for the  qualitative detection of nucleic acid from SARS-CoV-2 in nasopharyngeal  swabs  Results are for the presumptive identification of SARS-CoV-2 RNA  Positive results are indicative of infection with SARS-CoV-2, the virus  causing COVID-19, but do not rule out bacterial infection or co-infection  with other viruses  Laboratories within the [place] and its  territories are required to report all positive results to the appropriate  public health authorities  Negative results do not preclude SARS-CoV-2  infection and should not be used as the sole basis for treatment or other  patient management decisions  Negative results must be combined with  clinical observations, patient history, and epidemiological information  This test has not been FDA cleared or approved  This test has been authorized by FDA under an Emergency Use Authorization  (EUA)   This test is only authorized for the duration of time the  declaration that circumstances exist justifying the authorization of the  emergency use of an in vitro diagnostic tests for detection of SARS-CoV-2  virus and/or diagnosis of COVID-19 infection under section 564(b)(1) of  the Act, 21 U  S C  024PHB-2(J)(3), unless the authorization is terminated  or revoked sooner  The test has been validated but independent review by FDA  and CLIA is pending  Test performed using [employer]: This RT-PCR assay targets N2,  a region unique to SARS-CoV-2  A conserved region in the E-gene was chosen  for pan-Sarbecovirus detection which includes SARS-CoV-2  Urine Microscopic [[id number]]  (Abnormal) Collected: 07/13/22 1934    Lab Status: Final result Specimen: Urine, Clean Catch Updated: 07/13/22 2013     RBC, UA None Seen /hpf      WBC, UA 4-10 /hpf      Epithelial Cells Occasional /hpf      Bacteria, UA None Seen /hpf      OTHER OBSERVATIONS WBCs Clumped  Trichomonas Organisms Present    UA (URINE) with reflex to Scope [[id number]]  (Abnormal) Collected: 07/13/22 1934    Lab Status: Final result Specimen: Urine, Clean Catch Updated: 07/13/22 1941     Color, UA Light Yellow     Clarity, UA Clear     Specific Gravity, UA <=1 005     pH, UA 6 0     Leukocytes, UA Trace     Nitrite, UA Negative     Protein, UA Negative mg/dl      Glucose, UA Negative mg/dl      Ketones, UA Negative mg/dl      Urobilinogen, UA 0 2 E U /dl      Bilirubin, UA Negative     Occult Blood, UA Negative    Urine culture [[id number]] Collected: 07/13/22 1934    Lab Status: In process Specimen: Urine, Clean Catch Updated: 07/13/22 1937                 No orders to display              Procedures  Procedures         ED Course                               SBIRT 22yo+    Flowsheet Row Most Recent Value   SBIRT (25 yo +)    In order to provide better care to our patients, we are screening all of our patients for alcohol and drug use  Would it be okay to ask you these screening questions?  Yes Filed at: 07/13/2022 1942   Initial Alcohol Screen: US AUDIT-C     1  How often do you have a drink containing alcohol? 0 Filed at: 07/13/2022 1942   2  How many drinks containing alcohol do you have on a typical day you are drinking? 0 Filed at: 07/13/2022 1942   3a  Male UNDER 65: How often do you have five or more drinks on one occasion? 0 Filed at: 07/13/2022 1942   3b  FEMALE Any Age, or MALE 65+: How often do you have 4 or more drinks on one occassion? 0 Filed at: 07/13/2022 1942   Audit-C Score 0 Filed at: 07/13/2022 1942   ANASTACIO: How many times in the past year have you    Used an illegal drug or used a prescription medication for non-medical reasons? Never Filed at: 07/13/2022 1942                    MDM  Number of Diagnoses or Management Options  COVID  Trichomonas vaginalis infection  Diagnosis management comments: It was noted that patient also had Trichomonas in her urine  I discussed this with her in and decided that we should treat her with Flagyl for this which the prescription was called in patient was given a dose here in the ED  I also advised her of the positive COVID and how she should quarantine and follow-up  Patient is agreeable to the plan I would also advised that if anything should change in thing start getting worse any new symptoms Ryland Terrazas return to the ED for evaluation  Disposition  Final diagnoses:   COVID   Trichomonas vaginalis infection     Time reflects when diagnosis was documented in both MDM as applicable and the Disposition within this note     Time User Action Codes Description Comment    7/13/2022  8:29 PM Miladis Flood Add [U07 1] COVID     7/13/2022  8:41 PM Miladis Flood Add [A59 9] Trichomonas vaginalis infection       ED Disposition     ED Disposition   Discharge    Condition   Stable    Date/Time   Wed Jul 13, 2022  8:29 PM    [facility] discharge to home/self care                 Follow-up Information     Follow up With Specialties Details Why Contact Info Additional Information    St  [address] Emergency Department Emergency Medicine  As needed [address] [address]  [address] [address] Emergency Department, [place], [place], [address] [facility] Emergency Department Emergency Medicine  As needed [address] [address]  [address] [address] Emergency Department, [place], [place], [address]          Discharge Medication List as of 7/13/2022  8:43 PM      START taking these medications    Details   metroNIDAZOLE (FLAGYL) 500 mg tablet Take 1 tablet (500 mg total) by mouth every 12 (twelve) hours for 7 days, Starting Wed 7/13/2022, Until Wed 7/20/2022, Normal         CONTINUE these medications which have NOT CHANGED    Details   buPROPion (Wellbutrin XL) 150 mg 24 hr tablet Take 1 tablet (150 mg total) by mouth every morning, Starting Mon 6/6/2022, Until Wed 7/6/2022, Normal      pantoprazole (PROTONIX) 40 mg tablet Take 1 tablet (40 mg total) by mouth daily, Starting Mon 6/6/2022, Until Wed 7/6/2022, Normal             No discharge procedures on file      PDMP Review     None          ED Provider  Electronically Signed by           Chandra Blakely DO  07/13/22 9549

## 2022-07-19 ENCOUNTER — ANNUAL EXAM (OUTPATIENT)
Dept: FAMILY MEDICINE CLINIC | Facility: CLINIC | Age: 37
End: 2022-07-19
Payer: COMMERCIAL

## 2022-07-19 VITALS
OXYGEN SATURATION: 97 % | RESPIRATION RATE: 16 BRPM | HEIGHT: 64 IN | WEIGHT: 177.3 LBS | BODY MASS INDEX: 30.27 KG/M2 | SYSTOLIC BLOOD PRESSURE: 126 MMHG | HEART RATE: 58 BPM | DIASTOLIC BLOOD PRESSURE: 72 MMHG

## 2022-07-19 DIAGNOSIS — A59.9 TRICHOMONAS VAGINALIS INFECTION: ICD-10-CM

## 2022-07-19 DIAGNOSIS — Z12.4 SCREENING FOR CERVICAL CANCER: Primary | ICD-10-CM

## 2022-07-19 DIAGNOSIS — Z11.3 SCREENING FOR STDS (SEXUALLY TRANSMITTED DISEASES): ICD-10-CM

## 2022-07-19 PROCEDURE — 99213 OFFICE O/P EST LOW 20 MIN: CPT | Performed by: OBSTETRICS & GYNECOLOGY

## 2022-07-19 NOTE — PROGRESS NOTES
Annual GYN Visit    Assessment     Tatiana Gill was seen today for gynecologic exam     Diagnoses and all orders for this visit:    Screening for cervical cancer  -     IGP, Aptima HPV    Screening for STDs (sexually transmitted diseases)  -     Chlamydia/GC amplified DNA by PCR    Trichomonas vaginalis infection  Accidental finding of trichomonas in urine microscopy on 7/13 at the ED visit and started on flagyl 500mg bid x 7 days   Patient noted mild vaginal pruritus prior to starting the medication  But patient was mostly asymptomatic  · Continue with flagyl and finish the course  · F/u in 2 wks for test of cure  · Recommended to let her partner know for check up as well as to abstain from sexual intercourse before both partners are treated  Plan      All questions answered  Await pap smear results  Breast self exam technique reviewed and patient encouraged to perform self-exam monthly  Chlamydia specimen  Follow up in 2 week  GC specimen  Pap smear  I have discussed the importance of monthly self-breast exams, exercise and healthy diet as well as adequate intake of calcium and vitamin D  Safe sex practices have been discussed  The patient does desires STD testing  The current ASCCP guidelines were reviewed  The low risk patient will receive pap smear screening every 3 years or pap with HPV co-testing every 5 years  I emphasized the importance of an annual pelvic and breast exam   A yearly mammogram is recommended for breast cancer screening starting at age 36  All questions have been answered to her satisfaction  D/w Dr Tracey Nair is a 43 y o  female who presents for annual well woman exam  Periods are regular every 3 weeks, lasting 6 days  No intermenstrual bleeding, spotting, or discharge  GYN:  · No abnormal vaginal discharge, labial erythema or lesions  · She had mild vaginal pruritis, which is better   She was found to have positive trichomonas on the urine microscopy on   Treated with flagyl  · Heavy menstrual bleed  No hx of fibroid  · Contraception: none  · Patient is sexually active with 1 partner  · Gynecologic surgeries: tubal ligation, C sections    OB:  · 3 kids (2 c sections, 1 vaginal) and 1     :  · No dysuria, urinary frequency or urgency  · No hematuria, flank pain, incontinence  Breast:  · No breast mass, skin changes, dimpling, reddening, nipple retraction  · No breast discharge  · Patient does do monthly breast exams  · Last mammogram was in 2017  Results were normal    · Grandma from mom's side breast cancer  General:  · Diet: well balanced  · Exercise: walking    Screening:  · Cervical cancer: last pap smear in   Results were normal   · Breast cancer: last mammogram in   Results were normal   · Colon cancer: not indicated      Review of Systems  Pertinent items are noted in HPI  Objective      /72 (BP Location: Left arm, Patient Position: Sitting, Cuff Size: Standard)   Pulse 58   Resp 16   Ht 5' 4" (1 626 m)   Wt 80 4 kg (177 lb 4 8 oz)   LMP 2022   SpO2 97%   BMI 30 43 kg/m²     General:   alert and oriented, in no acute distress   Heart: regular rate and rhythm, S1, S2 normal, no murmur, click, rub or gallop   Lungs: clear to auscultation bilaterally   Abdomen: soft, non-tender, without masses or organomegaly   Vulva: normal   Vagina: normal mucosa   Cervix: no cervical motion tenderness   Uterus: normal size   Adnexa: normal adnexa   Breast exam: No mass palpated  No skin changes  No nipple discharge   No axillary LAD

## 2022-07-21 LAB
C TRACH RRNA SPEC QL NAA+PROBE: NEGATIVE
N GONORRHOEA RRNA SPEC QL NAA+PROBE: NEGATIVE

## 2022-07-22 LAB
CYTOLOGIST CVX/VAG CYTO: NORMAL
DX ICD CODE: NORMAL
HPV I/H RISK 4 DNA CVX QL PROBE+SIG AMP: NEGATIVE
OTHER STN SPEC: NORMAL
PATH REPORT.FINAL DX SPEC: NORMAL
SL AMB NOTE:: NORMAL
SL AMB SPECIMEN ADEQUACY: NORMAL
SL AMB TEST METHODOLOGY: NORMAL

## 2022-07-29 ENCOUNTER — OFFICE VISIT (OUTPATIENT)
Dept: FAMILY MEDICINE CLINIC | Facility: CLINIC | Age: 37
End: 2022-07-29
Payer: COMMERCIAL

## 2022-07-29 VITALS
WEIGHT: 175.7 LBS | SYSTOLIC BLOOD PRESSURE: 134 MMHG | HEART RATE: 77 BPM | BODY MASS INDEX: 30 KG/M2 | HEIGHT: 64 IN | RESPIRATION RATE: 17 BRPM | OXYGEN SATURATION: 99 % | TEMPERATURE: 97.9 F | DIASTOLIC BLOOD PRESSURE: 76 MMHG

## 2022-07-29 DIAGNOSIS — R53.83 FATIGUE, UNSPECIFIED TYPE: ICD-10-CM

## 2022-07-29 DIAGNOSIS — R51.9 ACUTE NONINTRACTABLE HEADACHE, UNSPECIFIED HEADACHE TYPE: ICD-10-CM

## 2022-07-29 DIAGNOSIS — A59.9 TRICHOMONIASIS: Primary | ICD-10-CM

## 2022-07-29 PROCEDURE — 99214 OFFICE O/P EST MOD 30 MIN: CPT | Performed by: FAMILY MEDICINE

## 2022-07-29 NOTE — PROGRESS NOTES
José Antonio Calhoun [facility] 24 Office visit  Assessment/Plan:     Buster Montez was seen today for follow-up  Diagnoses and all orders for this visit:    Trichomoniasis  Test of cure shows negative for trichomoniasis with wet mount  Improved symptomatically after completing a course of flagyl  · Partner should be treated prior to sexual intercourse  · Discussed safe sex practice  Acute nonintractable headache, unspecified headache type  Constant HA started about a month ago, unspecific of the type  Prior readings of elevated BP  Doubtful of migraine or cluster type headache  · Patient will bring back a HA diary noting BP at least 3x weekly for 3 wks, meal time, type of food paying close attention to the sodium  Encouraged adequate hydration  · Continue with tylenol or ibuprofen in the meantime  · Consider starting on a BP medication on next visit if noted elevated BP at home  · BP today was 134/76    Fatigue, unspecified type  Last CBC in 2019 was 9 7  fatigue could be related to anemia  -     CBC and differential; Future    Return in about 4 weeks (around 8/26/2022) for f/u headache  Subjective:     42 yo F who was recently treated for trichomoniasis presented for a test of cure  She wants to also address her headache  She denied any vaginal itching or irritation today  No vaginal discharge or bleed  She has completed the flagyl  Notified patient of the negative pap and HPV as well as urine culture from the ED  Headache  Started about a month ago  Described pain as pressured, sharp and stabbing  It is at the back of the neck, around the top of the head, and at the temple regions  It occurs every couple of hours daily  She is using tylenol or ibuprofen with moderate relief  Associated with neck pain  Denies any triggers, aura, changes in vision, or any focal weakness  Denies any photo or phono phobia  Pain is constant, but worse at the evening  Better with sleeping   Pain is constanly around 4/10, but 7/10 when severe  No runny eyes or nose  She drinks about 48oz daily  She sometimes only eat at 2pm  She has had a couple readings of elevated BP  The following portions of the patient's history were reviewed and updated as appropriate: allergies, current medications, past family history, past medical history, past social history, past surgical history, and problem list      Review of Systems   Constitutional: Positive for fatigue  Negative for chills and fever  HENT: Negative for ear pain and sore throat  Eyes: Negative for pain and visual disturbance  Respiratory: Negative for cough and shortness of breath  Cardiovascular: Negative for chest pain and palpitations  Gastrointestinal: Negative for abdominal pain and vomiting  Genitourinary: Negative for dysuria and hematuria  Musculoskeletal: Positive for neck pain  Negative for arthralgias, back pain and neck stiffness  Skin: Negative for color change and rash  Neurological: Positive for headaches  Negative for seizures and syncope  All other systems reviewed and are negative  Objective:     /76 (BP Location: Left arm, Patient Position: Sitting, Cuff Size: Adult)   Pulse 77   Temp 97 9 °F (36 6 °C) (Tympanic)   Resp 17   Ht 5' 4" (1 626 m)   Wt 79 7 kg (175 lb 11 2 oz)   LMP 06/30/2022   SpO2 99%   BMI 30 16 kg/m²      Physical Exam  Constitutional:       General: She is not in acute distress  Appearance: Normal appearance  HENT:      Head: Normocephalic and atraumatic  Eyes:      Pupils: Pupils are equal, round, and reactive to light  Cardiovascular:      Rate and Rhythm: Normal rate and regular rhythm  Pulses: Normal pulses  Heart sounds: Normal heart sounds  No murmur heard  Pulmonary:      Effort: Pulmonary effort is normal  No respiratory distress  Breath sounds: Normal breath sounds  No wheezing  Abdominal:      General: Bowel sounds are normal       Palpations: Abdomen is soft  Genitourinary:     General: Normal vulva  Comments: Negative wet mount microscopy for trichomoniasis  Negative pseudohyphae on KOH prep   Musculoskeletal:      Cervical back: Normal range of motion  No rigidity  Skin:     General: Skin is warm and dry  Neurological:      General: No focal deficit present  Mental Status: She is alert and oriented to person, place, and time     Psychiatric:         Mood and Affect: Mood normal          Behavior: Behavior normal           ** Please Note: This note has been constructed using a voice recognition system **     [address] Jordan MD  08/01/22  3:33 PM

## 2022-08-11 ENCOUNTER — OFFICE VISIT (OUTPATIENT)
Dept: OTOLARYNGOLOGY | Facility: CLINIC | Age: 37
End: 2022-08-11
Payer: COMMERCIAL

## 2022-08-11 VITALS — HEIGHT: 64 IN | WEIGHT: 173 LBS | TEMPERATURE: 97.5 F | BODY MASS INDEX: 29.53 KG/M2

## 2022-08-11 DIAGNOSIS — K21.9 LARYNGOPHARYNGEAL REFLUX (LPR): ICD-10-CM

## 2022-08-11 DIAGNOSIS — R09.89 GLOBUS PHARYNGEUS: Primary | ICD-10-CM

## 2022-08-11 PROCEDURE — 99213 OFFICE O/P EST LOW 20 MIN: CPT | Performed by: OTOLARYNGOLOGY

## 2022-08-11 NOTE — PROGRESS NOTES
Assessment/Plan:  Overall, pt is doing well  Continue pantoprazole, but take 1/2 to 1 hour before eating  The patient still has a bit of a cough and random wheezing, which is most likely related to her continued vaping  F/u in 6 months  No problem-specific Assessment & Plan notes found for this encounter  Diagnoses and all orders for this visit:    Globus pharyngeus    Laryngopharyngeal reflux (LPR)          Subjective:      Patient ID: [id number] Bentley is a 43 y o  female  HPI    The following portions of the patient's history were reviewed and updated as appropriate: allergies, current medications, past family history, past medical history, past social history, past surgical history and problem list     Review of Systems      Objective:      Temp 97 5 °F (36 4 °C) (Temporal)   Ht 5' 4" (1 626 m)   Wt 78 5 kg (173 lb)   BMI 29 70 kg/m²          Physical Exam  Constitutional:       Appearance: She is well-developed  HENT:      Head: Normocephalic and atraumatic  Right Ear: Tympanic membrane, ear canal and external ear normal  No drainage  No middle ear effusion  Left Ear: Tympanic membrane, ear canal and external ear normal  No drainage  No middle ear effusion  Nose: Septal deviation present  Mouth/Throat:      Pharynx: Uvula midline  No oropharyngeal exudate  Tonsils: 2+ on the right  2+ on the left  Neck:      Thyroid: No thyroid mass or thyromegaly  Trachea: Trachea normal  No tracheal deviation  Lymphadenopathy:      Cervical: No cervical adenopathy  Neurological:      Mental Status: She is alert

## 2022-10-03 ENCOUNTER — OFFICE VISIT (OUTPATIENT)
Dept: URGENT CARE | Facility: CLINIC | Age: 37
End: 2022-10-03

## 2022-10-03 VITALS
RESPIRATION RATE: 18 BRPM | SYSTOLIC BLOOD PRESSURE: 139 MMHG | OXYGEN SATURATION: 100 % | DIASTOLIC BLOOD PRESSURE: 79 MMHG | HEART RATE: 69 BPM | TEMPERATURE: 96.3 F

## 2022-10-03 DIAGNOSIS — J20.9 ACUTE BRONCHITIS, UNSPECIFIED ORGANISM: Primary | ICD-10-CM

## 2022-10-03 RX ORDER — BENZONATATE 200 MG/1
200 CAPSULE ORAL 3 TIMES DAILY PRN
Qty: 20 CAPSULE | Refills: 0 | Status: SHIPPED | OUTPATIENT
Start: 2022-10-03 | End: 2022-11-02 | Stop reason: ALTCHOICE

## 2022-10-03 RX ORDER — ALBUTEROL SULFATE 90 UG/1
2 AEROSOL, METERED RESPIRATORY (INHALATION) EVERY 6 HOURS PRN
Qty: 1 G | Refills: 0 | Status: SHIPPED | OUTPATIENT
Start: 2022-10-03

## 2022-10-03 NOTE — PROGRESS NOTES
[employer]        NAME: Víctor Ruelas is a 42 y o  female  : 1985    MRN: [id number]  DATE: October 3, 2022  TIME: 5:32 PM    Assessment and Plan   Acute bronchitis, unspecified organism [J20 [id number]]  1  Acute bronchitis, unspecified organism       Patient Instructions   Acute bronchitis  Discussed with the patient the viral likelyhood of bronchitis and that antibiotics will likely not shorten course  rx tessalon perls sent via EMR as needed for cough  rx albuterol hfa as needed for wheezing and SOB  Can also try mucinex DM or robitussin DM as needed for cough  Rest, fluids and supportive care    Follow up with PCP in 3-5 days  Proceed to  ER if symptoms worsen  Chief Complaint     Chief Complaint   Patient presents with   • Cold Like Symptoms     X thursday         History of Present Illness       Mason Herron is a 25-year-old female who presents to clinic complaining of cough x3 days  She states the last 3 days cough is progressively worsening and today had hoarse voice  She describes the cough is nonproductive worse when she lays down in a she is also complaining of rhinorrhea  She did have 1 episode of vomiting after coughing fit 3 days ago but none since  She denies any fever, chills, nausea, ear pain, sore throat, nasal congestion, shortness of breath, loss of taste or smell, recent travel, or exposure to anyone known COVID positive  She did a COVID test yesterday was negative  Review of Systems   Review of Systems   Constitutional: Negative for chills, fatigue and fever  HENT: Positive for rhinorrhea and voice change  Negative for congestion, ear pain, sinus pressure, sinus pain and sore throat  Respiratory: Positive for cough  Negative for shortness of breath  Gastrointestinal: Negative for diarrhea, nausea and vomiting  Musculoskeletal: Negative for myalgias  Neurological: Negative for headaches           Current Medications       Current Outpatient Medications:   • buPROPion (Wellbutrin XL) 150 mg 24 hr tablet, Take 1 tablet (150 mg total) by mouth every morning, Disp: 30 tablet, Rfl: 0  •  pantoprazole (PROTONIX) 40 mg tablet, Take 1 tablet (40 mg total) by mouth daily (Patient not taking: Reported on 2022), Disp: 30 tablet, Rfl: 3    Current Allergies     Allergies as of 10/03/2022   • (No Known Allergies)            The following portions of the patient's history were reviewed and updated as appropriate: allergies, current medications, past family history, past medical history, past social history, past surgical history and problem list      Past Medical History:   Diagnosis Date   • Anxiety    • Depression    • Epicondylitis, lateral, right 2018   • Gallstones    • Hypertension     With pregnancy   • Kidney stones    • Kidney stones     bilateral   • Urinary tract infection        Past Surgical History:   Procedure Laterality Date   •  SECTION       &    • CHOLECYSTECTOMY      lap   • EXTRACORPOREAL SHOCK WAVE LITHOTRIPSY Right 12/10/2010    15mm stone   • EXTRACORPOREAL SHOCK WAVE LITHOTRIPSY Right 2011    5mm stone  Did not have f/u x-ray after ESW[facility]  Sent reminder and slip  Negligent patient     • INDUCED      • MO CYSTO/URETERO W/LITHOTRIPSY &INDWELL STENT INSRT Right 2016    Procedure: CYSTOSCOPY, RETROGRADE PYELOGRAM,URETEROSCOPY WITH HOLMIUM LASER LITHOTRIPSY,STONE BASKET EXTRACTION,  AND INSERTION URETERAL STENT;  Surgeon: Mamie Rothman MD;  Location: [address] [place] [address];  Service: Urology   • MO CYSTOURETHROSCOPY,URETER CATHETER Right 2016    Procedure: CYSTOSCOPY RETROGRADE PYELOGRAM WITH INSERTION STENT URETERAL;  Surgeon: Mamie Rothman MD;  Location: [address] [place] [address];  Service: Urology   • TUBAL LIGATION     • URETERAL STENT PLACEMENT Right 2010    Pregnant   • URETERAL STENT PLACEMENT Right 2010    Replaced still pregnant   • URETERAL STENT PLACEMENT Right 2010    Still Pregnant Family History   Problem Relation Age of Onset   • Cancer Mother    • Kidney disease Mother    • Cirrhosis Mother         Hepatic   • Cancer Maternal Grandmother    • Diabetes Maternal Grandmother    • Kidney disease Maternal Grandmother          Medications have been verified  Objective   /79   Pulse 69   Temp (!) 96 3 °F (35 7 °C)   Resp 18   LMP 09/25/2020   SpO2 100%   Patient's last menstrual period was 09/25/2020  Physical Exam     Physical Exam  Vitals and nursing note reviewed  Constitutional:       General: She is not in acute distress  Appearance: Normal appearance  She is not ill-appearing  HENT:      Right Ear: Tympanic membrane, ear canal and external ear normal       Left Ear: Tympanic membrane, ear canal and external ear normal       Nose: Rhinorrhea present  Mouth/Throat:      Mouth: Mucous membranes are moist       Pharynx: No oropharyngeal exudate or posterior oropharyngeal erythema  Cardiovascular:      Rate and Rhythm: Normal rate and regular rhythm  Heart sounds: Normal heart sounds  Pulmonary:      Effort: No respiratory distress  Breath sounds: No stridor  Examination of the right-upper field reveals wheezing  Examination of the left-upper field reveals wheezing  Wheezing present  No rhonchi or rales  Lymphadenopathy:      Cervical: No cervical adenopathy  Neurological:      Mental Status: She is alert and oriented to person, place, and time     Psychiatric:         Mood and Affect: Mood normal          Behavior: Behavior normal

## 2022-11-02 ENCOUNTER — OFFICE VISIT (OUTPATIENT)
Dept: FAMILY MEDICINE CLINIC | Facility: CLINIC | Age: 37
End: 2022-11-02

## 2022-11-02 VITALS
SYSTOLIC BLOOD PRESSURE: 132 MMHG | TEMPERATURE: 99 F | WEIGHT: 171 LBS | HEIGHT: 64 IN | BODY MASS INDEX: 29.19 KG/M2 | DIASTOLIC BLOOD PRESSURE: 80 MMHG | HEART RATE: 75 BPM | RESPIRATION RATE: 16 BRPM | OXYGEN SATURATION: 99 %

## 2022-11-02 DIAGNOSIS — Z23 ENCOUNTER FOR IMMUNIZATION: ICD-10-CM

## 2022-11-02 DIAGNOSIS — R03.0 ELEVATED BP WITHOUT DIAGNOSIS OF HYPERTENSION: ICD-10-CM

## 2022-11-02 DIAGNOSIS — R51.9 CHRONIC NONINTRACTABLE HEADACHE, UNSPECIFIED HEADACHE TYPE: Primary | ICD-10-CM

## 2022-11-02 DIAGNOSIS — G89.29 CHRONIC NONINTRACTABLE HEADACHE, UNSPECIFIED HEADACHE TYPE: Primary | ICD-10-CM

## 2022-11-02 DIAGNOSIS — F41.8 DEPRESSION WITH ANXIETY: ICD-10-CM

## 2022-11-02 RX ORDER — ESCITALOPRAM OXALATE 10 MG/1
10 TABLET ORAL DAILY
Qty: 90 TABLET | Refills: 0 | Status: SHIPPED | OUTPATIENT
Start: 2022-11-02

## 2022-11-02 NOTE — PROGRESS NOTES
[facility]    Assessment/Plan:     1  Chronic nonintractable headache, unspecified headache type  Assessment & Plan:  Constant OWENS started since June 2022, unspecific of the type  Doubtful of migraine or cluster type headache  · BP readings at home are 895-992 systolic, but uncorrelated to the onset of headaches  · Reported adequate food and water intake  · Continue with tylenol or ibuprofen  · Currently supporting 3 kids and  who is going through cancer treatment  · Likely affected by psychosocial and mood  · Started on Lexapro today, f/u in 4-6 wks      2  Depression with anxiety  Assessment & Plan:  Was prescribed Bupropion, but pt has not been taking it since May since it has not showed improvement  · Started on Lexapro today, f/u in 4-6 wks   · Provided a list of therapists, CBT could be helpful    Orders:  -     escitalopram (Lexapro) 10 mg tablet; Take 1 tablet (10 mg total) by mouth daily    3  Elevated BP without diagnosis of hypertension  Assessment & Plan:  Noted home BP ranging from 130-150/75-95  Patient has intractable headaches throughout the day that are not correlated to the elevated BP readings  · Likely contributed by psychosocial stress  · Continue to monitor BP on f/u visits      4  Encounter for immunization  -     influenza vaccine, quadrivalent, 0 5 mL, preservative-free, for adult and pediatric patients 6 mos+ (AFLURIA, FLUARIX, FLULAVAL, FLUZONE)  -     Tdap vaccine greater than or equal to 8yo IM      Return in about 4 weeks (around 11/30/2022) for Recheck in 4-6 wks  Subjective:   QUYEN  Kyle Turner is a 42 y o  female with h/o headache presented for a f/u  Headache  Started since June  It's constant throughout the day  Sharp, stabbing, pressured pain around the frontal and posterior head  Described it as "jolts of pain " Denies photophobia/phonophobia  No rhinorrhea or congestion  Taking Tylenol 2 g and Advil for pain   Eating Dash diet with salt substitute  Drinking enough water and not skipping meals  On slim shakes and maintaining healthy diet  Smokes marijuana  Elevated BP  Recorded BP at home ranges 130-150/75-95  On Dash diet  Getting sleep study to r/o SANTOS  She is currently supporting the family of 3 children and  who is undergoing treatment for cancer  Depression and anxiety  Previously prescribed Wellbutrin by [facility], but patient stopped taking it due to not getting any significant effect  Review of Systems   Constitutional: Negative for chills and fever  HENT: Negative for ear pain and sore throat  Eyes: Negative for pain and visual disturbance  Respiratory: Negative for cough and shortness of breath  Cardiovascular: Negative for chest pain and palpitations  Gastrointestinal: Negative for abdominal pain and vomiting  Genitourinary: Negative for dysuria and hematuria  Musculoskeletal: Negative for arthralgias and back pain  Skin: Negative for color change and rash  Neurological: Positive for headaches  Negative for dizziness, seizures, syncope and light-headedness  Psychiatric/Behavioral: Positive for dysphoric mood  All other systems reviewed and are negative  Objective:     /80   Pulse 75   Temp 99 °F (37 2 °C)   Resp 16   Ht 5' 4" (1 626 m)   Wt 77 6 kg (171 lb)   SpO2 99%   BMI 29 35 kg/m²      Physical Exam  Constitutional:       General: She is not in acute distress  Appearance: Normal appearance  HENT:      Head: Normocephalic and atraumatic  Eyes:      Pupils: Pupils are equal, round, and reactive to light  Cardiovascular:      Rate and Rhythm: Normal rate and regular rhythm  Pulses: Normal pulses  Heart sounds: Normal heart sounds  No murmur heard  Pulmonary:      Effort: Pulmonary effort is normal  No respiratory distress  Breath sounds: Normal breath sounds  No wheezing     Abdominal:      General: Bowel sounds are normal       Palpations: Abdomen is soft    Skin:     General: Skin is warm and dry  Neurological:      General: No focal deficit present  Mental Status: She is alert and oriented to person, place, and time  Psychiatric:         Behavior: Behavior normal          Thought Content:  Thought content normal           ** Please Note: This note has been constructed using a voice recognition system **     [facility] VALERIE CHRISTIE JR  [facility]  11/05/22  12:28 AM

## 2022-11-03 LAB
BASOPHILS # BLD AUTO: 0 X10E3/UL (ref 0–0.2)
BASOPHILS NFR BLD AUTO: 1 %
EOSINOPHIL # BLD AUTO: 0.5 X10E3/UL (ref 0–0.4)
EOSINOPHIL NFR BLD AUTO: 9 %
ERYTHROCYTE [DISTWIDTH] IN BLOOD BY AUTOMATED COUNT: 16.9 % (ref 11.7–15.4)
HCT VFR BLD AUTO: 35.6 % (ref 34–46.6)
HGB BLD-MCNC: 10.6 G/DL (ref 11.1–15.9)
IMM GRANULOCYTES # BLD: 0 X10E3/UL (ref 0–0.1)
IMM GRANULOCYTES NFR BLD: 0 %
LYMPHOCYTES # BLD AUTO: 0.7 X10E3/UL (ref 0.7–3.1)
LYMPHOCYTES NFR BLD AUTO: 14 %
MCH RBC QN AUTO: 21.2 PG (ref 26.6–33)
MCHC RBC AUTO-ENTMCNC: 29.8 G/DL (ref 31.5–35.7)
MCV RBC AUTO: 71 FL (ref 79–97)
MONOCYTES # BLD AUTO: 1 X10E3/UL (ref 0.1–0.9)
MONOCYTES NFR BLD AUTO: 19 %
NEUTROPHILS # BLD AUTO: 3 X10E3/UL (ref 1.4–7)
NEUTROPHILS NFR BLD AUTO: 57 %
PLATELET # BLD AUTO: 365 X10E3/UL (ref 150–450)
RBC # BLD AUTO: 5 X10E6/UL (ref 3.77–5.28)
WBC # BLD AUTO: 5.1 X10E3/UL (ref 3.4–10.8)

## 2022-11-04 PROBLEM — G89.29 CHRONIC NONINTRACTABLE HEADACHE: Status: ACTIVE | Noted: 2022-11-04

## 2022-11-04 PROBLEM — R03.0 ELEVATED BP WITHOUT DIAGNOSIS OF HYPERTENSION: Status: ACTIVE | Noted: 2022-11-04

## 2022-11-04 PROBLEM — F41.8 DEPRESSION WITH ANXIETY: Status: ACTIVE | Noted: 2022-11-04

## 2022-11-04 PROBLEM — F32.A DEPRESSION: Status: ACTIVE | Noted: 2022-11-04

## 2022-11-04 PROBLEM — R51.9 ACUTE NONINTRACTABLE HEADACHE: Status: ACTIVE | Noted: 2022-11-04

## 2022-11-04 NOTE — ASSESSMENT & PLAN NOTE
Was prescribed Bupropion, but pt has not been taking it since May since it has not showed improvement    · Started on Lexapro today, f/u in 4-6 wks   · Provided a list of therapists, CBT could be helpful

## 2022-11-04 NOTE — ASSESSMENT & PLAN NOTE
Constant HA started since June 2022, unspecific of the type  Doubtful of migraine or cluster type headache     · BP readings at home are 511-961 systolic, but uncorrelated to the onset of headaches  · Reported adequate food and water intake  · Continue with tylenol or ibuprofen  · Currently supporting 3 kids and  who is going through cancer treatment  · Likely affected by psychosocial and mood  · Started on Lexapro today, f/u in 4-6 wks

## 2022-11-05 NOTE — ASSESSMENT & PLAN NOTE
Noted home BP ranging from 130-150/75-95  Patient has intractable headaches throughout the day that are not correlated to the elevated BP readings    · Likely contributed by psychosocial stress  · Continue to monitor BP on f/u visits

## 2023-02-03 DIAGNOSIS — D64.9 ANEMIA, UNSPECIFIED TYPE: Primary | ICD-10-CM

## 2023-02-03 DIAGNOSIS — J20.9 ACUTE BRONCHITIS, UNSPECIFIED ORGANISM: ICD-10-CM

## 2023-02-03 DIAGNOSIS — K21.9 LARYNGOPHARYNGEAL REFLUX (LPR): ICD-10-CM

## 2023-02-03 DIAGNOSIS — F41.8 DEPRESSION WITH ANXIETY: ICD-10-CM

## 2023-02-03 RX ORDER — PANTOPRAZOLE SODIUM 40 MG/1
40 TABLET, DELAYED RELEASE ORAL DAILY
Qty: 30 TABLET | Refills: 0 | Status: SHIPPED | OUTPATIENT
Start: 2023-02-03 | End: 2023-03-05

## 2023-02-03 RX ORDER — ESCITALOPRAM OXALATE 20 MG/1
20 TABLET ORAL DAILY
Start: 2023-02-03

## 2023-02-03 RX ORDER — ALBUTEROL SULFATE 90 UG/1
2 AEROSOL, METERED RESPIRATORY (INHALATION) EVERY 6 HOURS PRN
Qty: 18 G | Refills: 1 | Status: SHIPPED | OUTPATIENT
Start: 2023-02-03

## 2023-02-03 NOTE — PROGRESS NOTES
Called and spoke with patient today to follow-up with chronic symptoms  She states that since her last visit with me, she has had less frequent headaches  But she has been under a lot of stress and Lexapro 10 mg is not really helping  She is taking her medications daily as instructed  In addition, her last CBC showed Hgb of 10 6 with MCV 71  She is also feeling tired most days  Patient also stated that she has been having a lot of cough and wheezing especially closer to bedtime  She does have history of asthma and GERD, but she ran out of her medications for both conditions  1  Anemia, unspecified type  -     Iron Panel (Includes Ferritin, Iron Sat%, Iron, and TIBC); Future    2  Laryngopharyngeal reflux (LPR)  Comments:  Reported wheezing and cough especially closer to bedtime after dinner  Recommended to continue taking Protonix 40 mg daily  Reviewed lifestyle modification fo  Orders:  -     pantoprazole (PROTONIX) 40 mg tablet; Take 1 tablet (40 mg total) by mouth daily    3  Acute bronchitis, unspecified organism  Comments:  Increased cough, wheezing closer to bedtime  Patient ran out of albuterol  Refilled albuterol  Orders:  -     albuterol (Proventil HFA) 90 mcg/act inhaler; Inhale 2 puffs every 6 (six) hours as needed for wheezing    4  Depression with anxiety  Assessment & Plan:  Started on Lexapro 10 mg daily on last visit  Patient stated she has not seen a significant improvement  Patient has also been under a lot of stress with her car not working for 3 weeks  · Given Hgb 10 6 with low MCV, will check for iron deficiency anemia  · Check vitamin D deficiency  · Increase Lexapro to 20 mg daily    Orders:  -     escitalopram (Lexapro) 20 mg tablet; Take 1 tablet (20 mg total) by mouth daily  -     Iron Panel (Includes Ferritin, Iron Sat%, Iron, and TIBC); Future  -     Vitamin D 25 hydroxy;  Future  -     Vitamin D 25 hydroxy

## 2023-02-04 NOTE — ASSESSMENT & PLAN NOTE
Started on Lexapro 10 mg daily on last visit  Patient stated she has not seen a significant improvement  Patient has also been under a lot of stress with her car not working for 3 weeks    · Given Hgb 10 6 with low MCV, will check for iron deficiency anemia  · Check vitamin D deficiency  · Increase Lexapro to 20 mg daily

## 2023-02-16 ENCOUNTER — TELEPHONE (OUTPATIENT)
Dept: OTOLARYNGOLOGY | Facility: CLINIC | Age: 38
End: 2023-02-16

## 2023-02-16 NOTE — TELEPHONE ENCOUNTER
Left message for patient regarding no show appointment on 2/16/2023  Ask her to call the office back to schedule

## 2023-10-15 ENCOUNTER — APPOINTMENT (EMERGENCY)
Dept: RADIOLOGY | Facility: HOSPITAL | Age: 38
End: 2023-10-15
Payer: COMMERCIAL

## 2023-10-15 ENCOUNTER — HOSPITAL ENCOUNTER (EMERGENCY)
Facility: HOSPITAL | Age: 38
Discharge: HOME | End: 2023-10-15
Attending: EMERGENCY MEDICINE
Payer: COMMERCIAL

## 2023-10-15 VITALS
BODY MASS INDEX: 29.35 KG/M2 | TEMPERATURE: 96.8 F | RESPIRATION RATE: 20 BRPM | HEIGHT: 64 IN | OXYGEN SATURATION: 99 % | SYSTOLIC BLOOD PRESSURE: 130 MMHG | HEART RATE: 86 BPM | DIASTOLIC BLOOD PRESSURE: 74 MMHG

## 2023-10-15 DIAGNOSIS — S93.402A LEFT ANKLE SPRAIN: Primary | ICD-10-CM

## 2023-10-15 PROCEDURE — 73610 X-RAY EXAM OF ANKLE: CPT

## 2023-10-15 RX ORDER — NAPROXEN 500 MG/1
500 TABLET ORAL ONCE
Status: COMPLETED | OUTPATIENT
Start: 2023-10-15 | End: 2023-10-15

## 2023-10-15 RX ORDER — ACETAMINOPHEN 325 MG/1
975 TABLET ORAL ONCE
Status: COMPLETED | OUTPATIENT
Start: 2023-10-15 | End: 2023-10-15

## 2023-10-15 RX ORDER — NAPROXEN 500 MG/1
500 TABLET ORAL 2 TIMES DAILY WITH MEALS
Qty: 30 TABLET | Refills: 0 | Status: SHIPPED | OUTPATIENT
Start: 2023-10-15

## 2023-10-15 RX ADMIN — NAPROXEN 500 MG: 500 TABLET ORAL at 02:15

## 2023-10-15 RX ADMIN — ACETAMINOPHEN 975 MG: 325 TABLET ORAL at 02:14

## 2023-10-16 NOTE — ED PROVIDER NOTES
Final Diagnosis:  1. Left ankle sprain        Chief Complaint   Patient presents with    Ankle Injury     Patient was walking home and she fell and twisted her left ankle. Pain is 8 out of 10. Patient took 1 Advil for pain at midnight. HPI  Patient presents after she was walking home, mechanical fall. Twisted left ankle. Swelling over lateral malleolus w/ pain in anterior and posterior aspect. Yolis w/ ambulation. Took advil. 8/10 pain but cheerful and laughing. No contrib med hx, in paritulcar no anticoagulation. No head strike LOC no headache n/v.     EMS reports if applicable: N/A     - Previous charting underwent limited review with attention to last ED visits, labs, ekgs, and prior imaging.   Chart review reveals :     Office Visit on 07/29/2022   Component Date Value Ref Range Status    White Blood Cell Count 11/03/2022 5.1  3.4 - 10.8 x10E3/uL Final    Red Blood Cell Count 11/03/2022 5.00  3.77 - 5.28 x10E6/uL Final    Hemoglobin 11/03/2022 10.6 (L)  11.1 - 15.9 g/dL Final    HCT 11/03/2022 35.6  34.0 - 46.6 % Final    MCV 11/03/2022 71 (L)  79 - 97 fL Final    MCH 11/03/2022 21.2 (L)  26.6 - 33.0 pg Final    MCHC 11/03/2022 29.8 (L)  31.5 - 35.7 g/dL Final    RDW 11/03/2022 16.9 (H)  11.7 - 15.4 % Final    Platelet Count 11/49/8473 365  150 - 450 x10E3/uL Final    Neutrophils 11/03/2022 57  Not Estab. % Final    Lymphocytes 11/03/2022 14  Not Estab. % Final    Monocytes 11/03/2022 19  Not Estab. % Final    Eosinophils 11/03/2022 9  Not Estab. % Final    Basophils PCT 11/03/2022 1  Not Estab. % Final    Neutrophils (Absolute) 11/03/2022 3.0  1.4 - 7.0 x10E3/uL Final    Lymphocytes (Absolute) 11/03/2022 0.7  0.7 - 3.1 x10E3/uL Final    Monocytes (Absolute) 11/03/2022 1.0 (H)  0.1 - 0.9 x10E3/uL Final    Eosinophils (Absolute) 11/03/2022 0.5 (H)  0.0 - 0.4 x10E3/uL Final    Basophils ABS 11/03/2022 0.0  0.0 - 0.2 x10E3/uL Final    Immature Granulocytes 11/03/2022 0  Not Estab. % Final    Immature Granulocytes (Absolute) 2022 0.0  0.0 - 0.1 x10E3/uL Final       - No language barrier.   - History obtained from patient . - There are no limitations to the history obtained. - Discuss patient's care, with patient permission or by chart review, with      PMH:   has a past medical history of Anxiety, Depression, Epicondylitis, lateral, right (2018), Gallstones, Hypertension, Kidney stones, Kidney stones, and Urinary tract infection. PSH:   has a past surgical history that includes Extracorporeal shock wave lithotripsy (Right, 12/10/2010); Extracorporeal shock wave lithotripsy (Right, 2011); Ureteral stent placement (Right, 2010); Ureteral stent placement (Right, 2010); Ureteral stent placement (Right, 2010); Cholecystectomy (); Tubal ligation ();  section; Induced  (); pr cysto/uretero w/lithotripsy &indwell stent insrt (Right, 2016); and pr cysto bladder w/ureteral catheterization (Right, 2016). Social History:  Tobacco Use: Medium Risk (2022)    Patient History     Smoking Tobacco Use: Former     Smokeless Tobacco Use: Never     Passive Exposure: Not on file     Alcohol Use: Not on file     No illicit use       ROS:  Pertinent positives/negatives: Angelito Lute Some ROS may be present in the HPI and would take precedent over these standard questions asked below. Review of Systems     CONSTITUTIONAL:  No lethargy. No weakness. No unexpected weight loss. No appetite change. EYES:  No pain, redness, or discharge. No loss of vision. No orbital trauma or pain. ENT:  No tinnitus or decreased hearing. No epistaxis/purulent rhinorrhea. No voice change, airway closing, trismus. CARDIOVASCULAR:  No chest pain. No skin mottling or pallor. No change in exertional capacity  RESPIRATORY:  No hemoptysis. No paroxysmal nocturnal dyspnea. No stridor. No audible wheezing. No production with cough. GASTROINTESTINAL:  Normal appetite.  No vomiting, diarrhea. No pain. No bloating. No melena. No hematochezia. GENITOURINARY:  No frequency, urgency, nocturia. No hematuria or dysuria. No discharge. No sores/adenopathy. MUSCULOSKELETAL:  No arthralgias or myalgias that are new. No new deformity. INTEGUMENTARY:  No swelling. No unexpected contusions. No abrasions. No lymphangitis. NEUROLOGIC:  No meningismus. No new numbness of the extremities. No new focal weakness. No postural instability  PSYCHIATRIC:  No SI HI AVH  HEMATOLOGICAL:  No bleeding. No petechiae. No bruising. ALLERGIES:  No urticaria. No sudden abd cramping. No stridor. PE:     Physical exam highlights:   Physical Exam       Vitals:    10/15/23 0139 10/15/23 0145 10/15/23 0149 10/15/23 0212   BP: 130/74 130/74     BP Location: Right arm Right arm     Pulse: 76 86     Resp: 20 20     Temp:    (!) 96.8 °F (36 °C)   TempSrc:    Tympanic   SpO2: 100% 99% 99%    Height: 5' 4" (1.626 m)        Vitals reviewed by me. Nursing note reviewed  Chaperone present for all sensitive exam.  Const: No acute distress. Alert. Nontoxic. Not diaphoretic. HEENT: External ears normal. No protrusion drainage swelling. Nose normal. No drainage/traumatic deformity. MMM. Mouth with baseline/symmetric movement. No trismus. Eyes: No squinting. No icterus. No tearing/swelling/drainage. Tracks through the room with normal EOM. Neck: ROM normal. No rigidity. No meningismus. Cards: Rate as per vitals Compared to monitor sinus unless documented. Regular Well perfused. Pulm: able to verbalize without additional effort. Effort and excursion normal. No distress. No audible wheezing/ stridor. Normal resp rate without retraction or change in work of breathing. Abd: No distension beyond baseline. No fluctuant wave. Patient without peritoneal pain with shifting/bumping the bed. MSK: ROM normal baseline. No deformity. No contractures from baseline. Skin: No new rashes visible. Well perfused.  No wounds visualized on exposed skin  Neuro: Nonfocal. Baseline. CN grossly intact. Moving all four with coordination. Psych: Normal behavior and affect. A:  - Nursing note reviewed. Ddx and MDM  Considered diagnoses  R/o fracture dislocation    None on prelim          My conversation with consultant reveals: NA       Decision rules:         [place] ankle rule                  My read of the XR/CT scan reveals:  NA   XR ankle 3+ views LEFT   ED Interpretation   I don't see a fracture or dislocation      Final Result      No acute osseous abnormality. Workstation performed: [id number]             Orders Placed This Encounter   Procedures    XR ankle 3+ views LEFT     Labs Reviewed - No data to display    *Each of these labs was reviewed. Particular standout labs will be noted in the ED Course above     Final Diagnosis:  1. Left ankle sprain          P:  - hospital tx includes   Medications   acetaminophen (TYLENOL) tablet 975 mg (975 mg Oral Given 10/15/23 0214)   naproxen (NAPROSYN) tablet 500 mg (500 mg Oral Given 10/15/23 0215)         - dispositio  Time reflects when diagnosis was documented in both MDM as applicable and the Disposition within this note       Time User Action Codes Description Comment    10/15/2023  2:34 AM Kameron Vee [N45.954D] Left ankle sprain           ED Disposition       ED Disposition   Discharge    Condition   Stable    Date/Time   Sun Oct 15, 2023  2:34 AM    Comment   Leila Valentine discharge to home/self care. Follow-up Information       Follow up With Specialties Details Why [address] [place], MD Family Medicine               - patient will call their PCP to let them know they were in the emergency department. We discuss return precautions and patient is agreeable with plan and aformentioned disposition.        - additional treatment intended, if consistent with primary provider:  - patient to follow with :      Discharge Medication List as of 10/15/2023  2:35 AM        START taking these medications    Details   naproxen (Naprosyn) 500 mg tablet Take 1 tablet (500 mg total) by mouth 2 (two) times a day with meals, Starting Sun 10/15/2023, Normal           CONTINUE these medications which have NOT CHANGED    Details   albuterol (Proventil HFA) 90 mcg/act inhaler Inhale 2 puffs every 6 (six) hours as needed for wheezing, Starting Fri 2/3/2023, Normal      escitalopram (Lexapro) 20 mg tablet Take 1 tablet (20 mg total) by mouth daily, Starting Fri 2/3/2023, No Print      pantoprazole (PROTONIX) 40 mg tablet Take 1 tablet (40 mg total) by mouth daily, Starting Fri 2/3/2023, Until Sun 3/5/2023, Normal           No discharge procedures on file. Prior to Admission Medications   Prescriptions Last Dose Informant Patient Reported? Taking? albuterol (Proventil HFA) 90 mcg/act inhaler   No No   Sig: Inhale 2 puffs every 6 (six) hours as needed for wheezing   escitalopram (Lexapro) 20 mg tablet   No No   Sig: Take 1 tablet (20 mg total) by mouth daily   pantoprazole (PROTONIX) 40 mg tablet   No No   Sig: Take 1 tablet (40 mg total) by mouth daily      Facility-Administered Medications: None       Portions of the record may have been created with voice recognition software. Occasional wrong word or "sound a like" substitutions may have occurred due to the inherent limitations of voice recognition software. Read the chart carefully and recognize, using context, where substitutions have occurred.     Electronically signed by:  MD Porfirio Warner MD  10/16/23 [id number]

## 2024-06-13 ENCOUNTER — OFFICE VISIT (OUTPATIENT)
Age: 39
End: 2024-06-13

## 2024-06-13 ENCOUNTER — TELEPHONE (OUTPATIENT)
Dept: PSYCHIATRY | Facility: CLINIC | Age: 39
End: 2024-06-13

## 2024-06-13 VITALS
DIASTOLIC BLOOD PRESSURE: 73 MMHG | SYSTOLIC BLOOD PRESSURE: 112 MMHG | TEMPERATURE: 98.4 F | HEART RATE: 70 BPM | OXYGEN SATURATION: 99 % | BODY MASS INDEX: 26.43 KG/M2 | WEIGHT: 154 LBS

## 2024-06-13 DIAGNOSIS — F41.9 SEVERE ANXIETY: ICD-10-CM

## 2024-06-13 DIAGNOSIS — F32.A MODERATELY SEVERE DEPRESSION: Primary | ICD-10-CM

## 2024-06-13 PROCEDURE — 99203 OFFICE O/P NEW LOW 30 MIN: CPT | Performed by: FAMILY MEDICINE

## 2024-06-13 RX ORDER — PAROXETINE 10 MG/1
10 TABLET, FILM COATED ORAL DAILY
Qty: 30 TABLET | Refills: 2 | Status: SHIPPED | OUTPATIENT
Start: 2024-06-13

## 2024-06-13 RX ORDER — BUSPIRONE HYDROCHLORIDE 7.5 MG/1
7.5 TABLET ORAL 2 TIMES DAILY
Qty: 60 TABLET | Refills: 5 | Status: SHIPPED | OUTPATIENT
Start: 2024-06-13

## 2024-06-13 NOTE — PROGRESS NOTES
[facility] - Outpatient Clinic  Outpatient Visit - PADDY: 24     Patient's Information      Name: Earnestine Mark  Age/Sex: 38 y.o. female  MRN: [id number]  : 1985      Assessment/Plan     A/P: Earnestine Mark is a 38 y.o. female patient that came to the clinic today for MDD and JOSE G.   Chart reviewed. Plan below.     Moderately severe depression    PHQ-2/9 Depression Screening    Little interest or pleasure in doing things: 1 - several days  Feeling down, depressed, or hopeless: 3 - nearly every day  Trouble falling or staying asleep, or sleeping too much: 3 - nearly every day  Feeling tired or having little energy: 1 - several days  Poor appetite or overeatin - several days  Feeling bad about yourself - or that you are a failure or have let yourself or your family down: 2 - more than half the days  Trouble concentrating on things, such as reading the newspaper or watching television: 3 - nearly every day  Moving or speaking so slowly that other people could have noticed. Or the opposite - being so fidgety or restless that you have been moving around a lot more than usual: 1 - several days  Thoughts that you would be better off dead, or of hurting yourself in some way: 0 - not at all  PHQ-9 Score: 15  PHQ-9 Interpretation: Moderately severe depression    PHQ 9 score of 15. Non homicidal, non suicidal. Worsening depression. On no current treatment. Has used Lexapro priorly and didn't like it.     Started pt on Paxil 10 mg, PO, QD  Advised medication will take 4-6 weeks for full effect  Sent referral for [facility] and Behavioral health for help with talk therapy  Advised regarding risks and side effects of medication  Advised to stop medication if worsening symptoms and to call office for further assessment    Severe anxiety    JOSE G-7 Flowsheet Screening      Flowsheet Row Most Recent Value   Over the last 2 weeks, how often have you been bothered by any of  "the following problems?    Feeling nervous, anxious, or on edge 1   Not being able to stop or control worrying 3   Worrying too much about different things 3   Trouble relaxing 2   Being so restless that it is hard to sit still 1   Becoming easily annoyed or irritable 2   Feeling afraid as if something awful might happen 3   JOSE G-7 Total Score 15     JOSE G 7 score of 15. On no medications. Worsening anxiety affecting quality of life.     A/p as per MDD  Also added Buspar 7.5 mg, PO, BID for worsening anxiety symptoms    Associated orders were discussed and explained to the pt. Pertinent care gaps were addressed.   Pt voiced understanding and acceptance with A/P. Pt will call the office if any further questions/concerns.     Next Visit: Return in about 4 weeks (around 7/11/2024) for follow up on MDD and JOSE G.    A/P of patient's case was discussed with the Attending, Dr. Vishnu Hadley.    Subjective     History of Present Illness      Chief Complaint   Patient presents with    Follow-up     No concerns     38 y.o. female patient came to the clinic for assessment of MDD and JOSE G.     Pt refers being very emotional  Feels like  she is all over the place  She is always worrying about paying the bills  States that she hasn't been able to sleep well   Struggling to be able to help her kids  Gets irritable and stressed  Non suicidal, non homicidal  Pt refers she is a \"hot mess\"  Pt feeling very anxious and sad for the past few months    I reviewed patient's hx and updated as appropriate if needed: allergies, current medications, PMHx, FHx, social hx, surgical hx, and problem list.      Objective     Vital Signs     Visit Vitals  /73 (BP Location: Left arm, Patient Position: Sitting, Cuff Size: Standard)   Pulse 70   Temp 98.4 °F (36.9 °C) (Tympanic)   Wt 69.9 kg (154 lb)   SpO2 99%   BMI 26.43 kg/m²   OB Status Unknown   Smoking Status Former   BSA 1.75 m²      Physical Exam      Vitals and nursing note reviewed. "     Constitutional:       General: She is awake. She is not in acute distress.     Appearance: Normal appearance. She is overweight. She is not ill-appearing or toxic-appearing.   HENT:      Head: Normocephalic and atraumatic.      Right Ear: External ear normal.      Left Ear: External ear normal.      Nose: Nose normal.      Mouth/Throat:      Mouth: Mucous membranes are moist.   Eyes:      General: No scleral icterus.     Conjunctiva/sclera: Conjunctivae normal.   Cardiovascular:      Rate and Rhythm: Normal rate.      Pulses: Normal pulses.   Pulmonary:      Effort: Pulmonary effort is normal. No respiratory distress.      Breath sounds: Normal breath sounds.   Abdominal:      General: There is no distension.      Palpations: Abdomen is soft.      Tenderness: There is no abdominal tenderness.   Musculoskeletal:         General: Normal range of motion.      Cervical back: Normal range of motion.      Right lower leg: No edema.      Left lower leg: No edema.   Skin:     General: Skin is warm and dry.      Findings: No lesion or rash.   Neurological:      Mental Status: She is alert, oriented to person, place, and time and easily aroused.      Motor: No weakness.      Gait: Gait normal.   Psychiatric:         Attention and Perception: Attention normal.         Mood and Affect: Mood is anxious and depressed.         Speech: Speech normal.         Behavior: Behavior normal. Behavior is not agitated, aggressive or hyperactive. Behavior is cooperative.         Thought Content: Thought content normal. Thought content is not paranoid or delusional. Thought content does not include homicidal or suicidal ideation. Thought content does not include homicidal or suicidal plan.         Judgment: Judgment normal.          It was a pleasure being of service to Earnestine SINGH Jas. Thank you.     Parul Waite MD., MSMS.   [facility]      06/13/24   1:31 PM          Portions of the  "record may have been created with voice recognition software. Occasional wrong word or \"sound a like\" substitutions may have occurred due to the inherent limitations of voice recognition software. Read the chart carefully and recognize, using context, where substitutions have occurred.   "

## 2024-06-13 NOTE — TELEPHONE ENCOUNTER
Reached out to patient in regards to routine referral in attempts to verify patient's needs of service.Spoke with patient, writer informed pt due to insurance being Non Par, pt will need to be referred out. Pt verbalized understanding writer sent resource packet via postal mail.     Closed referral

## 2024-06-21 ENCOUNTER — OFFICE VISIT (OUTPATIENT)
Age: 39
End: 2024-06-21

## 2024-06-21 VITALS
HEART RATE: 80 BPM | SYSTOLIC BLOOD PRESSURE: 154 MMHG | WEIGHT: 150 LBS | TEMPERATURE: 98.2 F | DIASTOLIC BLOOD PRESSURE: 88 MMHG | BODY MASS INDEX: 25.75 KG/M2 | OXYGEN SATURATION: 99 %

## 2024-06-21 DIAGNOSIS — Z00.00 ANNUAL PHYSICAL EXAM: Primary | ICD-10-CM

## 2024-06-21 DIAGNOSIS — J20.9 ACUTE BRONCHITIS, UNSPECIFIED ORGANISM: ICD-10-CM

## 2024-06-21 DIAGNOSIS — F41.8 DEPRESSION WITH ANXIETY: ICD-10-CM

## 2024-06-21 DIAGNOSIS — Z12.31 ENCOUNTER FOR SCREENING MAMMOGRAM FOR BREAST CANCER: ICD-10-CM

## 2024-06-21 DIAGNOSIS — K13.0 LIP LESION: ICD-10-CM

## 2024-06-21 PROCEDURE — 99396 PREV VISIT EST AGE 40-64: CPT | Performed by: FAMILY MEDICINE

## 2024-06-21 RX ORDER — HYDROXYZINE HYDROCHLORIDE 25 MG/1
25 TABLET, FILM COATED ORAL
Qty: 30 TABLET | Refills: 1 | Status: SHIPPED | OUTPATIENT
Start: 2024-06-21

## 2024-06-21 RX ORDER — ALBUTEROL SULFATE 90 UG/1
AEROSOL, METERED RESPIRATORY (INHALATION)
Qty: 25.5 G | Refills: 0 | Status: SHIPPED | OUTPATIENT
Start: 2024-06-21 | End: 2024-06-24 | Stop reason: SDUPTHER

## 2024-06-21 NOTE — ASSESSMENT & PLAN NOTE
Lip lesion present for about a year. Asymmetrical, irregular boarders, brown color throughout, approximately 3-4 mm, slightly raised  See media      Plan  Dermatology referral

## 2024-06-21 NOTE — PROGRESS NOTES
Adult Annual Physical  Name: Earnestine Mark      : 1985      MRN: [id number]  Encounter Provider: Carolee Courtney MD  Encounter Date: 2024   Encounter department: [facility]    Depression  This is a chronic problem. The current episode started more than 1 year ago. The problem occurs constantly. The problem has been gradually improving. Pertinent negatives include no abdominal pain, anorexia, arthralgias, change in bowel habit, chest pain, chills, congestion, coughing, diaphoresis, fatigue, fever, headaches, joint swelling, myalgias, nausea, neck pain, numbness, rash, sore throat, swollen glands, urinary symptoms, vertigo, visual change, vomiting or weakness. The treatment provided mild relief.         Assessment & Plan   1. Annual physical exam  2. Lip lesion  Assessment & Plan:  Lip lesion present for about a year. Asymmetrical, irregular boarders, brown color throughout, approximately 3-4 mm, slightly raised  See media      Plan  Dermatology referral    Orders:  -     Ambulatory Referral to Dermatology; Future  3. Encounter for screening mammogram for breast cancer  -     Mammo screening bilateral w 3d & cad; Future; Expected date: 2024  4. Depression with anxiety  -     hydrOXYzine HCL (ATARAX) 25 mg tablet; Take 1 tablet (25 mg total) by mouth daily at bedtime    Immunizations and preventive care screenings were discussed with patient today. Appropriate education was printed on patient's after visit summary.    Counseling:  Alcohol/drug use: discussed moderation in alcohol intake, the recommendations for healthy alcohol use, and avoidance of illicit drug use.  Dental Health: discussed importance of regular tooth brushing, flossing, and dental visits.  Injury prevention: discussed safety/seat belts, safety helmets, smoke detectors, carbon dioxide detectors, and smoking near bedding or upholstery.  Sexual health: discussed sexually transmitted  diseases, partner selection, use of condoms, avoidance of unintended pregnancy, and contraceptive alternatives.  Exercise: the importance of regular exercise/physical activity was discussed. Recommend exercise 3-5 times per week for at least 30 minutes.     BMI Counseling: Body mass index is 25.75 kg/m². The BMI is above normal. Nutrition recommendations include decreasing portion sizes and encouraging healthy choices of fruits and vegetables. Exercise recommendations include exercising 3-5 times per week and strength training exercises. Rationale for BMI follow-up plan is due to patient being overweight or obese.     Depression Screening and Follow-up Plan: Patient's depression screening was positive with a PHQ-9 score of 14. Patient advised to follow-up with PCP for further management.         History of Present Illness     Adult Annual Physical:  Patient presents for annual physical.     Diet and Physical Activity:  - Diet/Nutrition:. could use some work; picks on food throughout the day; no set meals  - Exercise: 3-4 times a week on average, 1-2 times a week on average and strength training exercises.    Depression Screening:    - PHQ-9 Score: 14    General Health:  - Sleep: sleeps poorly and 1-3 hours of sleep on average.  - Hearing: normal hearing bilateral ears.  - Vision: wears contacts and most recent eye exam < 1 year ago.  - Dental: no dental visits for > 1 year. will f/u with next couple of months    /GYN Health:  - Follows with GYN: no.   - Menopause: premenopausal.   - Last menstrual cycle: 6/7/2024.   - History of STDs: no  - Contraception:. none, tubal; partner with Latex allergies      Advanced Care Planning:  - Has an advanced directive?: no    - Has a durable medical POA?: yes    - ACP document given to patient?: no      Review of Systems   Constitutional:  Negative for chills, diaphoresis, fatigue and fever.   HENT:  Negative for congestion, ear pain and sore throat.    Eyes:  Negative for pain  and visual disturbance.   Respiratory:  Negative for cough and shortness of breath.    Cardiovascular:  Negative for chest pain and palpitations.   Gastrointestinal:  Negative for abdominal pain, anorexia, change in bowel habit, nausea and vomiting.   Genitourinary:  Negative for dysuria and hematuria.   Musculoskeletal:  Negative for arthralgias, back pain, joint swelling, myalgias and neck pain.   Skin:  Negative for color change and rash.   Neurological:  Negative for vertigo, seizures, syncope, weakness, numbness and headaches.   Psychiatric/Behavioral:  Positive for depression.    All other systems reviewed and are negative.    Medical History Reviewed by provider this encounter:  Tobacco  Allergies  Meds  Problems  Med Hx  Surg Hx  Fam Hx       Current Outpatient Medications on File Prior to Visit   Medication Sig Dispense Refill    albuterol (Proventil HFA) 90 mcg/act inhaler Inhale 2 puffs every 6 (six) hours as needed for wheezing 18 g 1    busPIRone (BUSPAR) 7.5 mg tablet Take 1 tablet (7.5 mg total) by mouth 2 (two) times a day 60 tablet 5    PARoxetine (PAXIL) 10 mg tablet Take 1 tablet (10 mg total) by mouth daily 30 tablet 2    escitalopram (Lexapro) 20 mg tablet Take 1 tablet (20 mg total) by mouth daily (Patient not taking: Reported on 2024)      pantoprazole (PROTONIX) 40 mg tablet Take 1 tablet (40 mg total) by mouth daily 30 tablet 0    [DISCONTINUED] naproxen (Naprosyn) 500 mg tablet Take 1 tablet (500 mg total) by mouth 2 (two) times a day with meals (Patient not taking: Reported on 2024) 30 tablet 0     No current facility-administered medications on file prior to visit.      Social History     Tobacco Use    Smoking status: Former     Current packs/day: 0.00     Average packs/day: 0.3 packs/day for 15.0 years (3.8 ttl pk-yrs)     Types: Cigarettes     Start date:      Quit date: 2015     Years since quittin.4    Smokeless tobacco: Never    Tobacco comments:     vapes    Vaping Use    Vaping status: Every Day    Substances: Nicotine   Substance and Sexual Activity    Alcohol use: Yes     Comment: rare    Drug use: Yes     Types: Marijuana     Comment: MMP - medical marijuana    Sexual activity: Yes     Partners: Male       Objective     /88 (BP Location: Left arm, Patient Position: Sitting, Cuff Size: Standard)   Pulse 80   Temp 98.2 °F (36.8 °C) (Tympanic)   Wt 68 kg (150 lb)   SpO2 99%   BMI 25.75 kg/m²     Physical Exam  Vitals and nursing note reviewed.   Constitutional:       General: She is not in acute distress.     Appearance: She is well-developed.   HENT:      Head: Normocephalic and atraumatic.      Right Ear: Tympanic membrane, ear canal and external ear normal.      Left Ear: Tympanic membrane, ear canal and external ear normal.      Nose: Nose normal.      Mouth/Throat:      Mouth: Mucous membranes are moist.      Pharynx: Oropharynx is clear.      Comments: Lesion on bottom lip, see lesion   Eyes:      Extraocular Movements: Extraocular movements intact.      Conjunctiva/sclera: Conjunctivae normal.   Cardiovascular:      Rate and Rhythm: Normal rate and regular rhythm.      Heart sounds: No murmur heard.  Pulmonary:      Effort: Pulmonary effort is normal. No respiratory distress.      Breath sounds: Normal breath sounds.   Abdominal:      Palpations: Abdomen is soft.      Tenderness: There is no abdominal tenderness.   Musculoskeletal:         General: No swelling.      Cervical back: Neck supple.      Comments: Soft, mobile mass about 1-2cm (consistent with lipoma) on left upper thigh   Skin:     General: Skin is warm and dry.      Capillary Refill: Capillary refill takes less than 2 seconds.   Neurological:      Mental Status: She is alert.   Psychiatric:         Mood and Affect: Mood normal.

## 2024-06-23 PROBLEM — F41.9 SEVERE ANXIETY: Status: ACTIVE | Noted: 2024-06-23

## 2024-06-23 NOTE — ASSESSMENT & PLAN NOTE
JOSE G-7 Flowsheet Screening      Flowsheet Row Most Recent Value   Over the last 2 weeks, how often have you been bothered by any of the following problems?    Feeling nervous, anxious, or on edge 1   Not being able to stop or control worrying 3   Worrying too much about different things 3   Trouble relaxing 2   Being so restless that it is hard to sit still 1   Becoming easily annoyed or irritable 2   Feeling afraid as if something awful might happen 3   JOSE G-7 Total Score 15         JOSE G 7 score of 15. On no medications. Worsening anxiety affecting quality of life.     A/p as per MDD  Also added Buspar 7.5 mg, PO, BID for worsening anxiety symptoms

## 2024-06-23 NOTE — ASSESSMENT & PLAN NOTE
PHQ-2/9 Depression Screening    Little interest or pleasure in doing things: 1 - several days  Feeling down, depressed, or hopeless: 3 - nearly every day  Trouble falling or staying asleep, or sleeping too much: 3 - nearly every day  Feeling tired or having little energy: 1 - several days  Poor appetite or overeatin - several days  Feeling bad about yourself - or that you are a failure or have let yourself or your family down: 2 - more than half the days  Trouble concentrating on things, such as reading the newspaper or watching television: 3 - nearly every day  Moving or speaking so slowly that other people could have noticed. Or the opposite - being so fidgety or restless that you have been moving around a lot more than usual: 1 - several days  Thoughts that you would be better off dead, or of hurting yourself in some way: 0 - not at all  PHQ-9 Score: 15  PHQ-9 Interpretation: Moderately severe depression        PHQ 9 score of 15. Non homicidal, non suicidal. Worsening depression. On no current treatment. Has used Lexapro priorly and didn't like it.     Started pt on Paxil 10 mg, PO, QD  Advised medication will take 4-6 weeks for full effect  Sent referral for [facility] and Behavioral health for help with talk therapy  Advised regarding risks and side effects of medication  Advised to stop medication if worsening symptoms and to call office for further assessment

## 2024-06-24 RX ORDER — ALBUTEROL SULFATE 90 UG/1
1 AEROSOL, METERED RESPIRATORY (INHALATION) EVERY 6 HOURS PRN
Qty: 25.5 G | Refills: 0 | Status: SHIPPED | OUTPATIENT
Start: 2024-06-24

## 2024-07-05 ENCOUNTER — PATIENT OUTREACH (OUTPATIENT)
Age: 39
End: 2024-07-05

## 2024-07-05 NOTE — PROGRESS NOTES
SWCM received referral from provider to assist patient with needs, [facility] Tx resources.    SWCM completed chart review. SWCM called patient to follow up and assist with needs. SWCM spoke with patient. SWCM introduced self, role, and reason for outreach. Contact information provided.    Patient reports she is doing well. Patient reports needs for [facility] T[facility] resources. SWCM discussed options with patient. Patient requested resources be emailed to personal email at: [email or web address]  Patient requesting [facility] resources via telehealth.    SWCM encouraged patient to call with questions/ needs. SWCM will remain available to assist as needed.

## 2024-08-06 ENCOUNTER — PATIENT OUTREACH (OUTPATIENT)
Age: 39
End: 2024-08-06

## 2024-08-06 NOTE — LETTER
08/06/24    Dear Earnestine Mark,    I am a Care Manager with [address] [id number] [phone].  We have made several attempts to call you by phone.  It is important that you contact us back at [phone] so that we can assist with your care needs.     Sincerely,     Cathi Ordoñez, MUSTAPHAW

## 2024-08-06 NOTE — PROGRESS NOTES
SWCM completed chart review. SWCM called patient to follow up and assist with needs. SWCM unable to reach patient. Left voice message requesting return call. Contact information provided. SW sent unable to reach letter via [employer] to patient.     Per chart review, the following [facility] T[facility] resources provided to patient via email:    [facility]  Phone: [phone]    [facility]  Phone: [phone]    [facility]  Phone: [phone]    Telehealth:    [facility] [place]  Phone: [phone]    SWCM will continue to follow up and remain available to assist as needed.

## 2024-09-09 ENCOUNTER — PATIENT OUTREACH (OUTPATIENT)
Age: 39
End: 2024-09-09

## 2024-09-09 NOTE — PROGRESS NOTES
SWCM completed chart review. SWCM called patient to follow up and assist with needs. SWCM spoke with patient.     Patient reports she is doing well. Patient reports no need at this time for [facility] Tx as she is managing well. Patient reports no other needs currently. SWCM encouraged patient to call with questions/ needs. SWCM closed case and removed self from care team.     SWCM will remain available to assist as needed.

## 2024-09-12 DIAGNOSIS — K21.9 LARYNGOPHARYNGEAL REFLUX (LPR): ICD-10-CM

## 2024-09-12 DIAGNOSIS — F32.A MODERATELY SEVERE DEPRESSION: ICD-10-CM

## 2024-09-12 DIAGNOSIS — J20.9 ACUTE BRONCHITIS, UNSPECIFIED ORGANISM: ICD-10-CM

## 2024-09-12 DIAGNOSIS — F41.8 DEPRESSION WITH ANXIETY: ICD-10-CM

## 2024-09-12 RX ORDER — PAROXETINE 10 MG/1
10 TABLET, FILM COATED ORAL DAILY
Qty: 30 TABLET | Refills: 2 | Status: SHIPPED | OUTPATIENT
Start: 2024-09-12

## 2024-09-12 RX ORDER — PANTOPRAZOLE SODIUM 40 MG/1
40 TABLET, DELAYED RELEASE ORAL DAILY
Qty: 30 TABLET | Refills: 0 | Status: SHIPPED | OUTPATIENT
Start: 2024-09-12 | End: 2024-10-12

## 2024-09-12 RX ORDER — ALBUTEROL SULFATE 90 UG/1
1 AEROSOL, METERED RESPIRATORY (INHALATION) EVERY 6 HOURS PRN
Qty: 25.5 G | Refills: 0 | Status: SHIPPED | OUTPATIENT
Start: 2024-09-12

## 2024-09-12 RX ORDER — HYDROXYZINE HYDROCHLORIDE 25 MG/1
25 TABLET, FILM COATED ORAL
Qty: 30 TABLET | Refills: 1 | Status: SHIPPED | OUTPATIENT
Start: 2024-09-12 | End: 2024-09-17

## 2024-09-12 NOTE — TELEPHONE ENCOUNTER
Patient in office needing refills. She had an appt with Dr. Thakkar scheduled on 9/20/24 but it was cancelled because of scheduling.    She rescheduled her appt for 10/22/24 at 10am with Dr. Thakkar.    Preferred pharmacy: [facility]

## 2024-09-17 ENCOUNTER — OFFICE VISIT (OUTPATIENT)
Age: 39
End: 2024-09-17

## 2024-09-17 VITALS
DIASTOLIC BLOOD PRESSURE: 97 MMHG | OXYGEN SATURATION: 98 % | SYSTOLIC BLOOD PRESSURE: 139 MMHG | TEMPERATURE: 98.1 F | BODY MASS INDEX: 26.82 KG/M2 | WEIGHT: 157.1 LBS | HEIGHT: 64 IN | HEART RATE: 71 BPM

## 2024-09-17 DIAGNOSIS — D50.9 IRON DEFICIENCY ANEMIA, UNSPECIFIED IRON DEFICIENCY ANEMIA TYPE: Primary | ICD-10-CM

## 2024-09-17 DIAGNOSIS — G47.9 SLEEPING DIFFICULTY: ICD-10-CM

## 2024-09-17 DIAGNOSIS — R42 DIZZINESS: ICD-10-CM

## 2024-09-17 DIAGNOSIS — F41.8 DEPRESSION WITH ANXIETY: ICD-10-CM

## 2024-09-17 DIAGNOSIS — Z86.2 HISTORY OF ANEMIA: Primary | ICD-10-CM

## 2024-09-17 LAB
ALBUMIN SERPL-MCNC: 4.4 G/DL (ref 3.9–4.9)
ALP SERPL-CCNC: 56 IU/L (ref 44–121)
ALT SERPL-CCNC: 8 IU/L (ref 0–32)
AST SERPL-CCNC: 12 IU/L (ref 0–40)
BASOPHILS # BLD AUTO: 0.1 X10E3/UL (ref 0–0.2)
BASOPHILS NFR BLD AUTO: 1 %
BILIRUB SERPL-MCNC: 0.5 MG/DL (ref 0–1.2)
BUN SERPL-MCNC: 9 MG/DL (ref 6–24)
BUN/CREAT SERPL: 12 (ref 9–23)
CALCIUM SERPL-MCNC: 9 MG/DL (ref 8.7–10.2)
CHLORIDE SERPL-SCNC: 106 MMOL/L (ref 96–106)
CO2 SERPL-SCNC: 24 MMOL/L (ref 20–29)
CREAT SERPL-MCNC: 0.76 MG/DL (ref 0.57–1)
EGFR: 101 ML/MIN/1.73
EOSINOPHIL # BLD AUTO: 1 X10E3/UL (ref 0–0.4)
EOSINOPHIL NFR BLD AUTO: 14 %
ERYTHROCYTE [DISTWIDTH] IN BLOOD BY AUTOMATED COUNT: 17.5 % (ref 11.7–15.4)
EST. AVERAGE GLUCOSE BLD GHB EST-MCNC: 111 MG/DL
GLOBULIN SER-MCNC: 2.6 G/DL (ref 1.5–4.5)
GLUCOSE SERPL-MCNC: 88 MG/DL (ref 70–99)
HBA1C MFR BLD: 5.5 % (ref 4.8–5.6)
HCT VFR BLD AUTO: 34.8 % (ref 34–46.6)
HGB BLD-MCNC: 10.2 G/DL (ref 11.1–15.9)
IMM GRANULOCYTES # BLD: 0 X10E3/UL (ref 0–0.1)
IMM GRANULOCYTES NFR BLD: 0 %
LYMPHOCYTES # BLD AUTO: 0.9 X10E3/UL (ref 0.7–3.1)
LYMPHOCYTES NFR BLD AUTO: 12 %
MCH RBC QN AUTO: 22.4 PG (ref 26.6–33)
MCHC RBC AUTO-ENTMCNC: 29.3 G/DL (ref 31.5–35.7)
MCV RBC AUTO: 76 FL (ref 79–97)
MONOCYTES # BLD AUTO: 0.5 X10E3/UL (ref 0.1–0.9)
MONOCYTES NFR BLD AUTO: 8 %
NEUTROPHILS # BLD AUTO: 4.6 X10E3/UL (ref 1.4–7)
NEUTROPHILS NFR BLD AUTO: 65 %
PLATELET # BLD AUTO: 430 X10E3/UL (ref 150–450)
POTASSIUM SERPL-SCNC: 4.6 MMOL/L (ref 3.5–5.2)
PROT SERPL-MCNC: 7 G/DL (ref 6–8.5)
RBC # BLD AUTO: 4.56 X10E6/UL (ref 3.77–5.28)
SODIUM SERPL-SCNC: 141 MMOL/L (ref 134–144)
TSH SERPL DL<=0.005 MIU/L-ACNC: 1.17 UIU/ML (ref 0.45–4.5)
WBC # BLD AUTO: 7.1 X10E3/UL (ref 3.4–10.8)

## 2024-09-17 PROCEDURE — 99214 OFFICE O/P EST MOD 30 MIN: CPT | Performed by: FAMILY MEDICINE

## 2024-09-17 RX ORDER — TRAZODONE HYDROCHLORIDE 50 MG/1
50 TABLET, FILM COATED ORAL
Qty: 30 TABLET | Refills: 1 | Status: SHIPPED | OUTPATIENT
Start: 2024-09-17

## 2024-09-17 NOTE — PROGRESS NOTES
Ambulatory Visit  Name: Earnestine Mark      : 1985      MRN: [id number]  Encounter Provider: Roro Garcia MD  Encounter Date: 2024   Encounter department: [facility]    Assessment & Plan  History of anemia  Patient reports hx of anemia in mother. Last CBC completed 2022 showed low hemoglobin, low MCV, increased RDW. She reports chronic fatigue and cycle shortening with increased frequency of monthly bleeds with heavy flow for first few days. F/u with CBC and iron levels.   Orders:    CBC and differential; Future    Iron Panel (Includes Ferritin, Iron Sat%, Iron, and TIBC); Future    CBC and differential    Dizziness  Patient experiencing multiple episodes of dizziness, shakiness, tunnel vision over the past 2 years. Episodes are self-resolving, most recent episode resolved with intake of orange juice. She feels hot and sweaty after episodes resolve. Episodes last for seconds to 1-2 minutes. Hx of depression with anxiety and spoke with patient about the possibility of these episodes being panic attacks, however, patient denies that she felt panicked during episodes. Denies heart racing, palpitations. Last CMP completed May 2022, and A1c May of 2022 (5.5%). W/u for diabetes, hypoglycemia, thyroid dysfunction.   Orders:    TSH, 3rd generation with Free T4 reflex; Future    Comprehensive metabolic panel; Future    Hemoglobin A1C; Future    TSH, 3rd generation with Free T4 reflex    Comprehensive metabolic panel    Hemoglobin A1C    Sleeping difficulty  Due to increased difficulty with falling/staying asleep, will discontinue Atarax and initiate trazadone 50 mg once daily at bedtime.   Orders:    traZODone (DESYREL) 50 mg tablet; Take 1 tablet (50 mg total) by mouth daily at bedtime    Depression with anxiety  Previously doing Lexapro 20 mg daily, however this was switched to Paxil 10 mg daily.  She has been tolerating this well and feels that is helping  her.  She finds that she is less tearful.  She is additionally taking BuSpar 7.5 mg twice daily.  She was started on Atarax 25 mg nightly for sleep in June 2024.  She notes that this was not helping her as much, so she doubled up the dose to 50 mg.  She states that this would help her start to fall asleep but she was not able to stay asleep.  Will discontinue this at this time, and start trazodone 50 mg daily at bedtime.  Patient reports difficulty with establishing behavioral health therapy.  She was in touch with social work.  Did provide patient and her after visit summary with for possible location she can contact that have been known to take her insurance.  Patient appreciative of this.            History of Present Illness     Earnestine Mark is a 40 yo female with PMHx of depression with anxiety and chronic headaches who presents with multiple episodes of dizziness, tunnel vision, shakiness, and severe daily headaches. There have been approximately 4-5 episodes over the past 2 years with the most recent having been witnessed by her boyfriend and family. Encouraged to drink OJ to help with the symptoms and the episode resolved. She experienced feeling very hot and sweaty afterward. She maintains a sporadic diet typically eating little until she is at work and then will have snacks throughout the later day. Has tried taking ibuprofen 2 capsules once a day for the headaches with some relief. However, the headaches continue. She denies any history of migraines or associated sx like photophobia, lasting hours. Reports family hx of diabetes, and heart disease in grandparents. Mother has hx of anemia. Last CBC showed mild anemia (2022). States her menstrual cycle has shortened over the last year where she bleeds almost twice per month with heavier flow in the first few days. Also discussed increased trouble falling asleep and she tried double dosing Atarax to 50 mg by herself with little to no improvement in  "falling/staying asleep.             Review of Systems   Constitutional:  Negative for chills and fever.   HENT:  Negative for ear pain and sore throat.    Eyes:  Positive for visual disturbance. Negative for pain.   Respiratory:  Negative for cough and shortness of breath.    Cardiovascular:  Negative for chest pain and palpitations.   Gastrointestinal:  Negative for abdominal pain and vomiting.   Genitourinary:  Negative for dysuria and hematuria.   Musculoskeletal:  Negative for arthralgias and back pain.   Skin:  Negative for color change and rash.   Neurological:  Positive for dizziness. Negative for seizures and syncope.   Psychiatric/Behavioral:  Positive for sleep disturbance. Negative for dysphoric mood, self-injury and suicidal ideas. The patient is nervous/anxious.    All other systems reviewed and are negative.          Objective     /97 (BP Location: Left arm, Patient Position: Sitting, Cuff Size: Standard)   Pulse 71   Temp 98.1 °F (36.7 °C) (Tympanic)   Ht 5' 4\" (1.626 m)   Wt 71.3 kg (157 lb 1.6 oz)   SpO2 98%   BMI 26.97 kg/m²     Physical Exam  Vitals and nursing note reviewed.   Constitutional:       General: She is not in acute distress.     Appearance: Normal appearance. She is well-developed and normal weight. She is not ill-appearing.   HENT:      Head: Normocephalic and atraumatic.      Right Ear: External ear normal.      Left Ear: External ear normal.      Mouth/Throat:      Mouth: Mucous membranes are moist.   Eyes:      Extraocular Movements: Extraocular movements intact.      Conjunctiva/sclera: Conjunctivae normal.   Cardiovascular:      Rate and Rhythm: Normal rate and regular rhythm.      Pulses: Normal pulses.      Heart sounds: No murmur heard.  Pulmonary:      Effort: Pulmonary effort is normal. No respiratory distress.      Breath sounds: Normal breath sounds.   Abdominal:      Palpations: Abdomen is soft.      Tenderness: There is no abdominal tenderness. "   Musculoskeletal:         General: No swelling.      Cervical back: Neck supple.   Skin:     General: Skin is warm and dry.      Capillary Refill: Capillary refill takes less than 2 seconds.      Findings: No rash.   Neurological:      Mental Status: She is alert.      Motor: No weakness.      Gait: Gait normal.   Psychiatric:         Mood and Affect: Mood normal.

## 2024-09-17 NOTE — ASSESSMENT & PLAN NOTE
Previously doing Lexapro 20 mg daily, however this was switched to Paxil 10 mg daily.  She has been tolerating this well and feels that is helping her.  She finds that she is less tearful.  She is additionally taking BuSpar 7.5 mg twice daily.  She was started on Atarax 25 mg nightly for sleep in June 2024.  She notes that this was not helping her as much, so she doubled up the dose to 50 mg.  She states that this would help her start to fall asleep but she was not able to stay asleep.  Will discontinue this at this time, and start trazodone 50 mg daily at bedtime.  Patient reports difficulty with establishing behavioral health therapy.  She was in touch with social work.  Did provide patient and her after visit summary with for possible location she can contact that have been known to take her insurance.  Patient appreciative of this.

## 2024-09-17 NOTE — PATIENT INSTRUCTIONS
[facility]  Phone: [phone]     [facility]  Phone: [phone]     [facility]  Phone: [phone]     Telehealth:     [facility] [place]  Phone: [phone]

## 2024-09-18 LAB
FERRITIN SERPL-MCNC: 8 NG/ML (ref 15–150)
IRON SATN MFR SERPL: 5 % (ref 15–55)
IRON SERPL-MCNC: 25 UG/DL (ref 27–159)
TIBC SERPL-MCNC: 469 UG/DL (ref 250–450)
UIBC SERPL-MCNC: 444 UG/DL (ref 131–425)

## 2024-09-18 RX ORDER — FERROUS SULFATE 324(65)MG
324 TABLET, DELAYED RELEASE (ENTERIC COATED) ORAL EVERY OTHER DAY
Qty: 30 TABLET | Refills: 1 | Status: SHIPPED | OUTPATIENT
Start: 2024-09-18

## 2024-09-27 ENCOUNTER — OFFICE VISIT (OUTPATIENT)
Age: 39
End: 2024-09-27

## 2024-09-27 VITALS
DIASTOLIC BLOOD PRESSURE: 88 MMHG | TEMPERATURE: 98 F | HEIGHT: 64 IN | BODY MASS INDEX: 26.15 KG/M2 | WEIGHT: 153.2 LBS | RESPIRATION RATE: 19 BRPM | SYSTOLIC BLOOD PRESSURE: 122 MMHG | OXYGEN SATURATION: 99 % | HEART RATE: 76 BPM

## 2024-09-27 DIAGNOSIS — R52 PAIN IN SURGICAL SCAR: Primary | ICD-10-CM

## 2024-09-27 DIAGNOSIS — L90.5 PAIN IN SURGICAL SCAR: Primary | ICD-10-CM

## 2024-09-27 PROCEDURE — 99213 OFFICE O/P EST LOW 20 MIN: CPT | Performed by: FAMILY MEDICINE

## 2024-09-27 NOTE — PROGRESS NOTES
Ambulatory Visit  Name: Earnestine Mark      : 1985      MRN: [id number]  Encounter Provider: Carolee Courtney MD  Encounter Date: 2024   Encounter department: [facility]    Assessment & Plan  Pain in surgical scar  Patient had a  more than 10 years ago  Noted bump on left side of scar.  Can be felt on palpation  Upon Valsalva, bump does not protrude  Differentials include keloid vs. sebaceous cyst    Plan  Monitor for signs of infection  Should pain present and not go away patient to call office  May consider ultrasound if pain persists          History of Present Illness     Earnestine is a 39 year old female who presents to the office for pain in her  scar.  She notes that on Tuesday she went to the gym for the first time in a month and on Wednesday while she was in the shower she noted some pain on the left side of her  scar.  She notes that it is a hard knot/ball.  Prior to this she did not feel any pain, and her family was concerns about possible hernia as her father had hernia repair.  Additionally, she wanted to review her lab results from most recent blood work.        History obtained from : patient  Review of Systems   Constitutional:  Negative for chills and fever.   HENT:  Negative for ear pain and sore throat.    Eyes:  Negative for pain and visual disturbance.   Respiratory:  Negative for cough and shortness of breath.    Cardiovascular:  Negative for chest pain and palpitations.   Gastrointestinal:  Negative for abdominal pain and vomiting.   Genitourinary:  Negative for dysuria and hematuria.   Musculoskeletal:  Negative for arthralgias and back pain.   Skin:  Negative for color change and rash.   Neurological:  Negative for seizures and syncope.   All other systems reviewed and are negative.    Medical History Reviewed by provider this encounter:       Current Outpatient Medications on File Prior to Visit   Medication  Sig Dispense Refill    albuterol (PROVENTIL HFA,VENTOLIN HFA) 90 mcg/act inhaler Inhale 1 puff every 6 (six) hours as needed for wheezing 25.5 g 0    busPIRone (BUSPAR) 7.5 mg tablet Take 1 tablet (7.5 mg total) by mouth 2 (two) times a day 60 tablet 5    ferrous sulfate 324 (65 Fe) mg Take 1 tablet (324 mg total) by mouth every other day 30 tablet 1    pantoprazole (PROTONIX) 40 mg tablet Take 1 tablet (40 mg total) by mouth daily 30 tablet 0    PARoxetine (PAXIL) 10 mg tablet Take 1 tablet (10 mg total) by mouth daily 30 tablet 2    traZODone (DESYREL) 50 mg tablet Take 1 tablet (50 mg total) by mouth daily at bedtime 30 tablet 1     No current facility-administered medications on file prior to visit.      Social History     Tobacco Use    Smoking status: Former     Current packs/day: 0.00     Average packs/day: 0.3 packs/day for 15.0 years (3.8 ttl pk-yrs)     Types: Cigarettes     Start date:      Quit date:      Years since quittin.7    Smokeless tobacco: Never    Tobacco comments:     vapes   Vaping Use    Vaping status: Every Day    Substances: Nicotine   Substance and Sexual Activity    Alcohol use: Yes     Comment: rare    Drug use: Yes     Types: Marijuana     Comment: MMP - medical marijuana    Sexual activity: Yes     Partners: Male         Objective     There were no vitals taken for this visit.    Physical Exam  Vitals and nursing note reviewed.   Constitutional:       General: She is not in acute distress.     Appearance: She is well-developed.   HENT:      Head: Normocephalic and atraumatic.      Right Ear: External ear normal.      Left Ear: External ear normal.      Nose: Nose normal.      Mouth/Throat:      Mouth: Mucous membranes are moist.   Eyes:      Extraocular Movements: Extraocular movements intact.      Conjunctiva/sclera: Conjunctivae normal.   Cardiovascular:      Rate and Rhythm: Normal rate and regular rhythm.      Pulses: Normal pulses.      Heart sounds: Normal heart  sounds. No murmur heard.  Pulmonary:      Effort: Pulmonary effort is normal. No respiratory distress.      Breath sounds: Normal breath sounds.   Abdominal:      General: Abdomen is flat. Bowel sounds are normal.      Palpations: Abdomen is soft.      Tenderness: There is abdominal tenderness (left side of  scar (felt like a little ball)).   Musculoskeletal:         General: No swelling.      Cervical back: Normal range of motion and neck supple.   Skin:     General: Skin is warm and dry.      Capillary Refill: Capillary refill takes less than 2 seconds.   Neurological:      Mental Status: She is alert and oriented to person, place, and time.   Psychiatric:         Mood and Affect: Mood normal.         Behavior: Behavior normal.         Thought Content: Thought content normal.         Judgment: Judgment normal.

## 2024-10-14 DIAGNOSIS — G47.9 SLEEPING DIFFICULTY: ICD-10-CM

## 2024-10-14 DIAGNOSIS — F32.A MODERATELY SEVERE DEPRESSION: ICD-10-CM

## 2024-10-14 RX ORDER — PAROXETINE 10 MG/1
10 TABLET, FILM COATED ORAL DAILY
Qty: 90 TABLET | Refills: 1 | Status: SHIPPED | OUTPATIENT
Start: 2024-10-14

## 2024-10-14 RX ORDER — TRAZODONE HYDROCHLORIDE 50 MG/1
50 TABLET, FILM COATED ORAL
Qty: 90 TABLET | Refills: 0 | Status: SHIPPED | OUTPATIENT
Start: 2024-10-14

## 2024-10-15 ENCOUNTER — HOSPITAL ENCOUNTER (OUTPATIENT)
Dept: RADIOLOGY | Facility: HOSPITAL | Age: 39
Discharge: HOME | End: 2024-10-15
Payer: COMMERCIAL

## 2024-10-15 VITALS — WEIGHT: 153 LBS | BODY MASS INDEX: 26.12 KG/M2 | HEIGHT: 64 IN

## 2024-10-15 DIAGNOSIS — Z12.31 ENCOUNTER FOR SCREENING MAMMOGRAM FOR BREAST CANCER: ICD-10-CM

## 2024-10-15 PROCEDURE — 77063 BREAST TOMOSYNTHESIS BI: CPT

## 2024-10-15 PROCEDURE — 77067 SCR MAMMO BI INCL CAD: CPT

## 2024-10-17 DIAGNOSIS — Z12.31 BREAST CANCER SCREENING BY MAMMOGRAM: Primary | ICD-10-CM

## 2024-10-30 DIAGNOSIS — K21.9 LARYNGOPHARYNGEAL REFLUX (LPR): ICD-10-CM

## 2024-10-30 RX ORDER — PANTOPRAZOLE SODIUM 40 MG/1
40 TABLET, DELAYED RELEASE ORAL DAILY
Qty: 30 TABLET | Refills: 0 | Status: SHIPPED | OUTPATIENT
Start: 2024-10-30 | End: 2024-11-29

## 2024-10-30 NOTE — TELEPHONE ENCOUNTER
Fax received from Pharmacy requesting:    Pantoprazole Sod.  40mg tab    [facility] [place]

## 2024-11-06 ENCOUNTER — OFFICE VISIT (OUTPATIENT)
Age: 39
End: 2024-11-06

## 2024-11-06 VITALS
HEART RATE: 83 BPM | HEIGHT: 64 IN | WEIGHT: 154 LBS | RESPIRATION RATE: 18 BRPM | SYSTOLIC BLOOD PRESSURE: 124 MMHG | OXYGEN SATURATION: 99 % | BODY MASS INDEX: 26.29 KG/M2 | DIASTOLIC BLOOD PRESSURE: 81 MMHG

## 2024-11-06 DIAGNOSIS — D50.0 IRON DEFICIENCY ANEMIA DUE TO CHRONIC BLOOD LOSS: Primary | ICD-10-CM

## 2024-11-06 DIAGNOSIS — N92.4 EXCESSIVE BLEEDING IN PREMENOPAUSAL PERIOD: ICD-10-CM

## 2024-11-06 PROCEDURE — 99213 OFFICE O/P EST LOW 20 MIN: CPT | Performed by: FAMILY MEDICINE

## 2024-11-06 RX ORDER — SODIUM CHLORIDE 9 MG/ML
20 INJECTION, SOLUTION INTRAVENOUS ONCE
OUTPATIENT
Start: 2024-11-11

## 2024-11-06 NOTE — PROGRESS NOTES
Ambulatory Visit  Name: Earnestine Mark      : 1985      MRN: [id number]  Encounter Provider: Carolee Courtney MD  Encounter Date: 2024   Encounter department: [facility]    Assessment & Plan  Iron deficiency anemia due to chronic blood loss  Likely secondary to heavy periods  Can go through 4 pads/hour when its heavy   Regular periods (monthly)  Venofer infusions ordered (Dr. Muñiz)  Continue taking iron supplementation  Discussed possible contraception use to minimize menstruation (will have follow up office visit)  Notes she is very hesitant about IUD (Mirena)  Pelvic US ordered to rule out any fibroids/polyps  May consider GI follow up in the future        Excessive bleeding in premenopausal period  See above  Orders:    US pelvis limited non OB; Future       History of Present Illness     Earnestine is a pleasant 39 year old who presents to the office for follow up on dizziness and fatigue. Chart review showed that her anemia is consistent with Iron deficiency. She notes that she has been taking iron supplementation but continues to have significant fatigue. She is open to venofer infusions (she has never had them in the past). Additionally, she is open to returning back to the office to discuss her heavy menstrual periods.         History obtained from : patient  Review of Systems   Constitutional:  Positive for fatigue. Negative for chills and fever.   HENT:  Negative for ear pain and sore throat.    Eyes:  Negative for pain and visual disturbance.   Respiratory:  Negative for cough and shortness of breath.    Cardiovascular:  Negative for chest pain and palpitations.   Gastrointestinal:  Negative for abdominal pain and vomiting.   Genitourinary:  Negative for dysuria and hematuria.   Musculoskeletal:  Negative for arthralgias and back pain.   Skin:  Negative for color change and rash.   Neurological:  Negative for seizures and syncope.   All other systems  "reviewed and are negative.    Medical History Reviewed by provider this encounter:  Tobacco  Allergies  Meds  Problems  Med Hx  Surg Hx  Fam Hx       Current Outpatient Medications on File Prior to Visit   Medication Sig Dispense Refill    albuterol (PROVENTIL HFA,VENTOLIN HFA) 90 mcg/act inhaler Inhale 1 puff every 6 (six) hours as needed for wheezing 25.5 g 0    busPIRone (BUSPAR) 7.5 mg tablet Take 1 tablet (7.5 mg total) by mouth 2 (two) times a day 60 tablet 5    ferrous sulfate 324 (65 Fe) mg Take 1 tablet (324 mg total) by mouth every other day 30 tablet 1    pantoprazole (PROTONIX) 40 mg tablet Take 1 tablet (40 mg total) by mouth daily 30 tablet 0    PARoxetine (PAXIL) 10 mg tablet Take 1 tablet (10 mg total) by mouth daily 90 tablet 1    traZODone (DESYREL) 50 mg tablet Take 1 tablet (50 mg total) by mouth daily at bedtime 90 tablet 0     No current facility-administered medications on file prior to visit.      Social History     Tobacco Use    Smoking status: Former     Current packs/day: 0.00     Average packs/day: 0.3 packs/day for 15.0 years (3.8 ttl pk-yrs)     Types: Cigarettes     Start date:      Quit date: 2015     Years since quittin.8    Smokeless tobacco: Never    Tobacco comments:     vapes   Vaping Use    Vaping status: Every Day    Substances: Nicotine   Substance and Sexual Activity    Alcohol use: Yes     Comment: rare    Drug use: Yes     Types: Marijuana     Comment: MMP - medical marijuana    Sexual activity: Yes     Partners: Male         Objective     /81 (BP Location: Right arm, Patient Position: Sitting)   Pulse 83   Resp 18   Ht 5' 4\" (1.626 m)   Wt 69.9 kg (154 lb)   LMP 10/10/2024   SpO2 99%   BMI 26.43 kg/m²     Physical Exam  Vitals and nursing note reviewed.   Constitutional:       General: She is not in acute distress.     Appearance: She is well-developed.   HENT:      Head: Normocephalic and atraumatic.      Right Ear: External ear normal.      " Left Ear: External ear normal.      Nose: Nose normal.      Mouth/Throat:      Mouth: Mucous membranes are moist.   Eyes:      Extraocular Movements: Extraocular movements intact.      Conjunctiva/sclera: Conjunctivae normal.   Cardiovascular:      Rate and Rhythm: Normal rate and regular rhythm.      Pulses: Normal pulses.      Heart sounds: Normal heart sounds. No murmur heard.  Pulmonary:      Effort: Pulmonary effort is normal. No respiratory distress.      Breath sounds: Normal breath sounds.   Abdominal:      General: Abdomen is flat. Bowel sounds are normal.      Palpations: Abdomen is soft.      Tenderness: There is no abdominal tenderness.   Musculoskeletal:         General: No swelling.      Cervical back: Normal range of motion and neck supple.   Skin:     General: Skin is warm and dry.      Capillary Refill: Capillary refill takes less than 2 seconds.   Neurological:      Mental Status: She is alert and oriented to person, place, and time.   Psychiatric:         Mood and Affect: Mood normal.         Behavior: Behavior normal.         Thought Content: Thought content normal.         Judgment: Judgment normal.

## 2024-11-06 NOTE — ASSESSMENT & PLAN NOTE
Likely secondary to heavy periods  Can go through 4 pads/hour when its heavy   Regular periods (monthly)  Venofer infusions ordered (Dr. Muñiz)  Continue taking iron supplementation  Discussed possible contraception use to minimize menstruation (will have follow up office visit)  Notes she is very hesitant about IUD (Mirena)  Pelvic US ordered to rule out any fibroids/polyps  May consider GI follow up in the future

## 2024-11-18 ENCOUNTER — TELEPHONE (OUTPATIENT)
Dept: INFUSION CENTER | Facility: HOSPITAL | Age: 39
End: 2024-11-18

## 2024-11-19 ENCOUNTER — CONSULT (OUTPATIENT)
Age: 39
End: 2024-11-19
Payer: COMMERCIAL

## 2024-11-19 VITALS — WEIGHT: 153.4 LBS | HEIGHT: 64 IN | BODY MASS INDEX: 26.19 KG/M2 | TEMPERATURE: 98.4 F

## 2024-11-19 DIAGNOSIS — K13.0 LIP LESION: ICD-10-CM

## 2024-11-19 PROCEDURE — 99203 OFFICE O/P NEW LOW 30 MIN: CPT | Performed by: DERMATOLOGY

## 2024-11-19 NOTE — PROGRESS NOTES
"[facility] Note     Patient Name: Earnestine Mark  Encounter Date: 11/19/24     Have you been cared for by a [facility]ologist in the last 3 years and, if so, which description applies to you?    NO.   I am considered a \"new\" patient and must complete all patient intake questions. I am FEMALE/of child-bearing potential.    REVIEW OF SYSTEMS:  Have you recently had or currently have any of the following? Recent fever or chills? No  Any non-healing wound? No  Are you pregnant or planning to become pregnant? No  Are you currently or planning to be nursing or breast feeding? No   PAST MEDICAL HISTORY:  Have you personally ever had or currently have any of the following?  If \"YES,\" then please provide more detail. Skin cancer (such as Melanoma, Basal Cell Carcinoma, Squamous Cell Carcinoma?  No  Tuberculosis, HIV/AIDS, Hepatitis B or C: No  Radiation Treatment No   HISTORY OF IMMUNOSUPPRESSION:   Do you have a history of any of the following:  Systemic Immunosuppression such as Diabetes, Biologic or Immunotherapy, Chemotherapy, Organ Transplantation, Bone Marrow Transplantation or Prednsione?  No    Answering \"YES\" requires the addition of the dotphrase \"IMMUNOSUPPRESSED\" as the first diagnosis of the patient's visit.   FAMILY HISTORY:  Any \"first degree relatives\" (parent, brother, sister, or child) with the following?    Skin Cancer, Pancreatic or Other Cancer? No   PATIENT EXPERIENCE:    Do you want the Dermatologist to perform a COMPLETE skin exam today including a clinical examination under the \"bra and underwear\" areas?  NO, Declined patient stated she is not prepare for skin exam for this visit.  If necessary, do we have your permission to call and leave a detailed message on your Preferred Phone number that includes your specific medical information?  Yes      No Known Allergies   Current Outpatient Medications:     albuterol (PROVENTIL HFA,VENTOLIN HFA) 90 mcg/act inhaler, Inhale 1 puff " every 6 (six) hours as needed for wheezing, Disp: 25.5 g, Rfl: 0    busPIRone (BUSPAR) 7.5 mg tablet, Take 1 tablet (7.5 mg total) by mouth 2 (two) times a day, Disp: 60 tablet, Rfl: 5    ferrous sulfate 324 (65 Fe) mg, Take 1 tablet (324 mg total) by mouth every other day, Disp: 30 tablet, Rfl: 1    pantoprazole (PROTONIX) 40 mg tablet, Take 1 tablet (40 mg total) by mouth daily, Disp: 30 tablet, Rfl: 0    PARoxetine (PAXIL) 10 mg tablet, Take 1 tablet (10 mg total) by mouth daily, Disp: 90 tablet, Rfl: 1    traZODone (DESYREL) 50 mg tablet, Take 1 tablet (50 mg total) by mouth daily at bedtime, Disp: 90 tablet, Rfl: 0          Whom besides the patient is providing clinical information about today's encounter?   NO ADDITIONAL HISTORIAN (patient alone provided history)    Physical Exam and Assessment/Plan by Diagnosis:    LENTIGO    Physical Exam:  Anatomic Location Affected:  Lower Lip  Morphological Description:  4 mm brown macule  Pertinent Positives:  Pertinent Negatives:    Additional History of Present Condition:  recently noticed. Asymptomatic    Assessment and Plan:  Based on a thorough discussion of this condition and the management approach to it (including a comprehensive discussion of the known risks, side effects and potential benefits of treatment), the patient (family) agrees to implement the following specific plan:  Reassured benign.  Monitor for changes  Photo taken      An lentigo is a benign , a small, light brown or tan tammy on the skin. They are typically found on sun exposed skin such as they face or on the backside of the forearms. They may darken in response to the sun and fade with decreased UV exposure.       Scribe Attestation      I,:  Veronique Miller MA am acting as a scribe while in the presence of the attending physician.:       I,:  Malissa Linn MD personally performed the services described in this documentation    as scribed in my presence.:

## 2024-11-19 NOTE — PATIENT INSTRUCTIONS
"EPHELIS (\"FRECKLE\")    Assessment and Plan:  Based on a thorough discussion of this condition and the management approach to it (including a comprehensive discussion of the known risks, side effects and potential benefits of treatment), the patient (family) agrees to implement the following specific plan:  Reassured benign.  Monitor for changes  Photo taken      An ephelis is a benign freckle, a small, light brown or tan tammy on the skin. They are typically found on sun exposed skin such as they face or on the backside of the forearms. The freckles darken in response to the sun and fade with decreased UV exposure. There can be hundreds or thousands of them on exposed skin.    Ephelides are particularly common in fair-skinned children and are not present at birth. People with white skin that cannot tan often have red hair and numerous ephelides. Ephelides can also occur in other races that have dark brown or black hair.    Ephelides are an inherited characteristic. People with many ephelides have at least one copy of a variant MC1R gene, which is the same variant that causes red hair. Freckles in non-Caucasians are associated with a different variant of the gene.  The pigment-forming cells, melanocytes, produce more pigment than usual in ephelides. The pigment is packaged as melanosomes and distributed to surrounding keratinocytes.  Ephelides increase in number following exposure to ultraviolet radiation in sunlight.    Ephelides are found on sun-exposed sites, particularly the nose and cheeks.  They are prominent during summer and fade during winter.  Light to dark brown flat spots  3-10 mm in diameter  Multiple spots may merge into large patches  Any shape; often round    Ephelides are usually diagnosed by a skin examination. However, if there is uncertainty, then diagnosis can be made via skin biopsy with the following features:  An increase in pigment is noted in otherwise normal skin  There is no increase in the " number of melanocytes (unlike lentigines)    As they are an inherited characteristic, they cannot be prevented. However, the summer darkening can be reduced by careful sun protection.  They can be advised to protect affected areas using broad-spectrum, spf 50+ sunscreen.  Cosmetic concealers can be applied to the freckles  Laser treatment may result in temporary lightening of the freckles    Often, ephelides become less prominent in adulthood.

## 2024-11-20 ENCOUNTER — HOSPITAL ENCOUNTER (OUTPATIENT)
Dept: INFUSION CENTER | Facility: HOSPITAL | Age: 39
Discharge: HOME | End: 2024-11-20
Attending: FAMILY MEDICINE
Payer: COMMERCIAL

## 2024-11-20 VITALS
OXYGEN SATURATION: 100 % | RESPIRATION RATE: 18 BRPM | TEMPERATURE: 96.8 F | SYSTOLIC BLOOD PRESSURE: 127 MMHG | HEART RATE: 51 BPM | DIASTOLIC BLOOD PRESSURE: 74 MMHG

## 2024-11-20 DIAGNOSIS — D50.0 IRON DEFICIENCY ANEMIA DUE TO CHRONIC BLOOD LOSS: Primary | ICD-10-CM

## 2024-11-20 DIAGNOSIS — N92.4 EXCESSIVE BLEEDING IN PREMENOPAUSAL PERIOD: ICD-10-CM

## 2024-11-20 PROCEDURE — 96365 THER/PROPH/DIAG IV INF INIT: CPT

## 2024-11-20 RX ORDER — SODIUM CHLORIDE 9 MG/ML
20 INJECTION, SOLUTION INTRAVENOUS ONCE
Status: COMPLETED | OUTPATIENT
Start: 2024-11-20 | End: 2024-11-20

## 2024-11-20 RX ORDER — SODIUM CHLORIDE 9 MG/ML
20 INJECTION, SOLUTION INTRAVENOUS ONCE
OUTPATIENT
Start: 2024-11-27

## 2024-11-20 RX ADMIN — IRON SUCROSE 100 MG: 20 INJECTION, SOLUTION INTRAVENOUS at 12:59

## 2024-11-20 RX ADMIN — SODIUM CHLORIDE 20 ML/HR: 9 INJECTION, SOLUTION INTRAVENOUS at 12:46

## 2024-11-27 ENCOUNTER — HOSPITAL ENCOUNTER (OUTPATIENT)
Dept: INFUSION CENTER | Facility: HOSPITAL | Age: 39
Discharge: HOME | End: 2024-11-27
Attending: FAMILY MEDICINE
Payer: COMMERCIAL

## 2024-11-27 VITALS
SYSTOLIC BLOOD PRESSURE: 129 MMHG | DIASTOLIC BLOOD PRESSURE: 77 MMHG | OXYGEN SATURATION: 100 % | HEART RATE: 77 BPM | RESPIRATION RATE: 18 BRPM | TEMPERATURE: 97.8 F

## 2024-11-27 DIAGNOSIS — N92.4 EXCESSIVE BLEEDING IN PREMENOPAUSAL PERIOD: ICD-10-CM

## 2024-11-27 DIAGNOSIS — D50.0 IRON DEFICIENCY ANEMIA DUE TO CHRONIC BLOOD LOSS: Primary | ICD-10-CM

## 2024-11-27 PROCEDURE — 96365 THER/PROPH/DIAG IV INF INIT: CPT

## 2024-11-27 RX ORDER — SODIUM CHLORIDE 9 MG/ML
20 INJECTION, SOLUTION INTRAVENOUS ONCE
Status: CANCELLED | OUTPATIENT
Start: 2024-12-04

## 2024-11-27 RX ORDER — SODIUM CHLORIDE 9 MG/ML
20 INJECTION, SOLUTION INTRAVENOUS ONCE
Status: COMPLETED | OUTPATIENT
Start: 2024-11-27 | End: 2024-11-27

## 2024-11-27 RX ADMIN — SODIUM CHLORIDE 20 ML/HR: 0.9 INJECTION, SOLUTION INTRAVENOUS at 14:58

## 2024-11-27 RX ADMIN — IRON SUCROSE 100 MG: 20 INJECTION, SOLUTION INTRAVENOUS at 14:58

## 2024-11-27 NOTE — PROGRESS NOTES
Earnestine Mark  tolerated treatment well with no complications.      Earnestine Mark is aware of future appt on 12/04 at 1300.     AVS printed and given to Earnestine Mark:  No (Declined by Earnestine Mark)

## 2024-11-27 NOTE — PLAN OF CARE
Problem: DISCHARGE PLANNING  Goal: Discharge to home or other facility with appropriate resources  Description: INTERVENTIONS:  - Identify barriers to discharge w/patient and caregiver  - Arrange for needed discharge resources and transportation as appropriate  - Identify discharge learning needs (meds, wound care, etc.)  - Arrange for interpretive services to assist at discharge as needed  - Refer to Case Management Department for coordinating discharge planning if the patient needs post-hospital services based on physician/advanced practitioner order or complex needs related to functional status, cognitive ability, or social support system  Outcome: Progressing     Problem: Potential for Falls  Goal: Patient will remain free of falls  Description: INTERVENTIONS:  - Educate patient/family on patient safety including physical limitations  - Instruct patient to call for assistance with activity   - Consult OT/PT to assist with strengthening/mobility   - Keep Call bell within reach  - Keep bed low and locked with side rails adjusted as appropriate  - Keep care items and personal belongings within reach  - Initiate and maintain comfort rounds  Outcome: Progressing     Problem: Knowledge Deficit  Goal: Patient/family/caregiver demonstrates understanding of disease process, treatment plan, medications, and discharge instructions  Description: Complete learning assessment and assess knowledge base.  Interventions:  - Provide teaching at level of understanding  - Provide teaching via preferred learning methods  Outcome: Progressing

## 2024-12-04 ENCOUNTER — HOSPITAL ENCOUNTER (OUTPATIENT)
Dept: INFUSION CENTER | Facility: HOSPITAL | Age: 39
Discharge: HOME | End: 2024-12-04
Attending: FAMILY MEDICINE
Payer: COMMERCIAL

## 2024-12-04 VITALS
DIASTOLIC BLOOD PRESSURE: 58 MMHG | SYSTOLIC BLOOD PRESSURE: 122 MMHG | HEART RATE: 73 BPM | OXYGEN SATURATION: 100 % | TEMPERATURE: 97.3 F | RESPIRATION RATE: 18 BRPM

## 2024-12-04 DIAGNOSIS — N92.4 EXCESSIVE BLEEDING IN PREMENOPAUSAL PERIOD: ICD-10-CM

## 2024-12-04 DIAGNOSIS — D50.0 IRON DEFICIENCY ANEMIA DUE TO CHRONIC BLOOD LOSS: Primary | ICD-10-CM

## 2024-12-04 RX ORDER — SODIUM CHLORIDE 9 MG/ML
20 INJECTION, SOLUTION INTRAVENOUS ONCE
Status: COMPLETED | OUTPATIENT
Start: 2024-12-04 | End: 2024-12-04

## 2024-12-04 RX ORDER — SODIUM CHLORIDE 9 MG/ML
20 INJECTION, SOLUTION INTRAVENOUS ONCE
Status: CANCELLED | OUTPATIENT
Start: 2024-12-12

## 2024-12-04 RX ADMIN — IRON SUCROSE 100 MG: 20 INJECTION, SOLUTION INTRAVENOUS at 13:07

## 2024-12-04 RX ADMIN — SODIUM CHLORIDE 20 ML/HR: 0.9 INJECTION, SOLUTION INTRAVENOUS at 13:07

## 2024-12-04 NOTE — PROGRESS NOTES
Earnestine Mark  tolerated treatment well with no complications.      Earnestine Mark is aware of future appt on 12/12/24.     AVS printed and given to Earnestine Mark:    No (Declined by Earnestine Mark)

## 2024-12-05 ENCOUNTER — OFFICE VISIT (OUTPATIENT)
Age: 39
End: 2024-12-05

## 2024-12-05 VITALS
SYSTOLIC BLOOD PRESSURE: 140 MMHG | WEIGHT: 154 LBS | DIASTOLIC BLOOD PRESSURE: 70 MMHG | RESPIRATION RATE: 16 BRPM | HEIGHT: 64 IN | BODY MASS INDEX: 26.29 KG/M2 | HEART RATE: 72 BPM | OXYGEN SATURATION: 98 % | TEMPERATURE: 98 F

## 2024-12-05 DIAGNOSIS — F32.A MODERATELY SEVERE DEPRESSION: Primary | ICD-10-CM

## 2024-12-05 DIAGNOSIS — D50.0 IRON DEFICIENCY ANEMIA DUE TO CHRONIC BLOOD LOSS: ICD-10-CM

## 2024-12-05 PROCEDURE — 99213 OFFICE O/P EST LOW 20 MIN: CPT | Performed by: FAMILY MEDICINE

## 2024-12-05 RX ORDER — PAROXETINE 10 MG/1
20 TABLET, FILM COATED ORAL DAILY
Qty: 90 TABLET | Refills: 1 | Status: SHIPPED | OUTPATIENT
Start: 2024-12-05

## 2024-12-05 NOTE — PROGRESS NOTES
Name: Earnestine Mark      : 1985      MRN: [id number]  Encounter Provider: Roslyn Jeff MD  Encounter Date: 2024   Encounter department: [facility]  :  Assessment & Plan  Moderately severe depression  Worsening depression with new stressors of sick mom moving into home. Sleep, diet, and activities of interest negatively affected. No thoughts of hurting herself or others. Intermittently taking Buspar.   -Increased Paxil does to 20mg daily  -Will establish care with behavioral health provider  -F/u in 6 weeks   Orders:    PARoxetine (PAXIL) 10 mg tablet; Take 2 tablets (20 mg total) by mouth daily    Ambulatory referral to Psych Services; Future    Iron deficiency anemia due to chronic blood loss  Secondary to heavy periods. Receiving weekly Venofer infusions without complications. Not interested in f/u IUD planning at this visit and has not completed OB ultrasound yet.   -Continue weekly infusions   -Complete f/u US   -F/u in 6 weeks              History of Present Illness     Patient has new stressors with sick mother living with her. Does not feel she has enough coverage with medications and would like an increase. No thoughts of hurting herself or anyone else. Infusions going well, not interested in IUD today and reports periods are okay now. Decrease in sleep, diet, and activities. No current outlet or exercise routine.       Review of Systems   Constitutional:  Negative for chills and fever.   HENT:  Negative for ear pain and sore throat.    Eyes:  Negative for pain and visual disturbance.   Respiratory:  Negative for cough and shortness of breath.    Cardiovascular:  Negative for chest pain and palpitations.   Gastrointestinal:  Negative for abdominal pain and vomiting.   Genitourinary:  Negative for dysuria and hematuria.   Musculoskeletal:  Negative for arthralgias and back pain.   Skin:  Negative for color change and rash.   Neurological:  Negative for  "seizures and syncope.   Psychiatric/Behavioral:  Negative for self-injury and suicidal ideas.    All other systems reviewed and are negative.         Objective   /70   Pulse 72   Temp 98 °F (36.7 °C) (Tympanic)   Resp 16   Ht 5' 4\" (1.626 m)   Wt 69.9 kg (154 lb)   SpO2 98%   BMI 26.43 kg/m²      Physical Exam  Vitals and nursing note reviewed.   Constitutional:       General: She is not in acute distress.     Appearance: She is well-developed. She is obese. She is not toxic-appearing.   HENT:      Head: Normocephalic and atraumatic.      Right Ear: External ear normal.      Left Ear: External ear normal.      Nose: No congestion or rhinorrhea.      Mouth/Throat:      Pharynx: No oropharyngeal exudate or posterior oropharyngeal erythema.   Eyes:      General: No scleral icterus.        Right eye: No discharge.         Left eye: No discharge.      Conjunctiva/sclera: Conjunctivae normal.   Cardiovascular:      Rate and Rhythm: Normal rate.      Pulses: Normal pulses.   Pulmonary:      Effort: Pulmonary effort is normal. No respiratory distress.      Breath sounds: Normal breath sounds. No wheezing or rales.   Musculoskeletal:      Cervical back: Neck supple. No rigidity or tenderness.   Skin:     General: Skin is warm and dry.      Capillary Refill: Capillary refill takes less than 2 seconds.   Neurological:      General: No focal deficit present.      Mental Status: She is alert and oriented to person, place, and time. Mental status is at baseline.      Cranial Nerves: No cranial nerve deficit.      Motor: No weakness.      Gait: Gait normal.   Psychiatric:         Mood and Affect: Affect is tearful.         Thought Content: Thought content normal. Thought content does not include homicidal or suicidal ideation. Thought content does not include homicidal or suicidal plan.         Judgment: Judgment normal.         "

## 2024-12-05 NOTE — ASSESSMENT & PLAN NOTE
Secondary to heavy periods. Receiving weekly Venofer infusions without complications. Not interested in f/u IUD planning at this visit and has not completed OB ultrasound yet.   -Continue weekly infusions   -Complete f/u US   -F/u in 6 weeks

## 2024-12-11 ENCOUNTER — TELEPHONE (OUTPATIENT)
Age: 39
End: 2024-12-11

## 2024-12-11 ENCOUNTER — HOSPITAL ENCOUNTER (OUTPATIENT)
Dept: INFUSION CENTER | Facility: HOSPITAL | Age: 39
Discharge: HOME | End: 2024-12-11
Attending: FAMILY MEDICINE

## 2024-12-11 NOTE — TELEPHONE ENCOUNTER
Vm on appt line:    Good morning. This is Earnestineleland Mark. I missed multiple calls from you guys and I apologize. I'm calling back. My number is [phone]. Thank you.    You received a voice mail from [address] CALLER.    Forwarding message to [employer].

## 2024-12-12 ENCOUNTER — HOSPITAL ENCOUNTER (OUTPATIENT)
Dept: INFUSION CENTER | Facility: HOSPITAL | Age: 39
Discharge: HOME | End: 2024-12-12
Attending: FAMILY MEDICINE
Payer: COMMERCIAL

## 2024-12-12 VITALS
SYSTOLIC BLOOD PRESSURE: 147 MMHG | DIASTOLIC BLOOD PRESSURE: 70 MMHG | HEART RATE: 78 BPM | OXYGEN SATURATION: 100 % | RESPIRATION RATE: 18 BRPM | TEMPERATURE: 97.5 F

## 2024-12-12 DIAGNOSIS — D50.0 IRON DEFICIENCY ANEMIA DUE TO CHRONIC BLOOD LOSS: Primary | ICD-10-CM

## 2024-12-12 DIAGNOSIS — N92.4 EXCESSIVE BLEEDING IN PREMENOPAUSAL PERIOD: ICD-10-CM

## 2024-12-12 PROCEDURE — 96365 THER/PROPH/DIAG IV INF INIT: CPT

## 2024-12-12 RX ORDER — SODIUM CHLORIDE 9 MG/ML
20 INJECTION, SOLUTION INTRAVENOUS ONCE
Status: CANCELLED | OUTPATIENT
Start: 2024-12-18

## 2024-12-12 RX ORDER — SODIUM CHLORIDE 9 MG/ML
20 INJECTION, SOLUTION INTRAVENOUS ONCE
Status: COMPLETED | OUTPATIENT
Start: 2024-12-12 | End: 2024-12-12

## 2024-12-12 RX ADMIN — IRON SUCROSE 100 MG: 20 INJECTION, SOLUTION INTRAVENOUS at 09:42

## 2024-12-12 RX ADMIN — SODIUM CHLORIDE 20 ML/HR: 0.9 INJECTION, SOLUTION INTRAVENOUS at 09:42

## 2024-12-18 ENCOUNTER — HOSPITAL ENCOUNTER (OUTPATIENT)
Dept: INFUSION CENTER | Facility: HOSPITAL | Age: 39
Discharge: HOME | End: 2024-12-18
Attending: FAMILY MEDICINE
Payer: COMMERCIAL

## 2024-12-18 VITALS
RESPIRATION RATE: 18 BRPM | HEART RATE: 74 BPM | TEMPERATURE: 97.8 F | SYSTOLIC BLOOD PRESSURE: 139 MMHG | OXYGEN SATURATION: 99 % | DIASTOLIC BLOOD PRESSURE: 66 MMHG

## 2024-12-18 DIAGNOSIS — D50.0 IRON DEFICIENCY ANEMIA DUE TO CHRONIC BLOOD LOSS: Primary | ICD-10-CM

## 2024-12-18 DIAGNOSIS — N92.4 EXCESSIVE BLEEDING IN PREMENOPAUSAL PERIOD: ICD-10-CM

## 2024-12-18 PROCEDURE — 96365 THER/PROPH/DIAG IV INF INIT: CPT

## 2024-12-18 RX ORDER — SODIUM CHLORIDE 9 MG/ML
20 INJECTION, SOLUTION INTRAVENOUS ONCE
Status: CANCELLED | OUTPATIENT
Start: 2024-12-23

## 2024-12-18 RX ORDER — SODIUM CHLORIDE 9 MG/ML
20 INJECTION, SOLUTION INTRAVENOUS ONCE
Status: COMPLETED | OUTPATIENT
Start: 2024-12-18 | End: 2024-12-18

## 2024-12-18 RX ADMIN — SODIUM CHLORIDE 20 ML/HR: 0.9 INJECTION, SOLUTION INTRAVENOUS at 14:12

## 2024-12-18 RX ADMIN — IRON SUCROSE 100 MG: 20 INJECTION, SOLUTION INTRAVENOUS at 14:12

## 2024-12-18 NOTE — PROGRESS NOTES
Earnestine Mark tolerated treatment well with no complications.      Earnestine Mark is aware of future appt on 12/23/24 at 1130.     AVS printed and given to Earnestine Mark: Yes.

## 2024-12-23 ENCOUNTER — HOSPITAL ENCOUNTER (OUTPATIENT)
Dept: RADIOLOGY | Facility: HOSPITAL | Age: 39
Discharge: HOME | End: 2024-12-23
Payer: COMMERCIAL

## 2024-12-23 ENCOUNTER — HOSPITAL ENCOUNTER (OUTPATIENT)
Dept: INFUSION CENTER | Facility: HOSPITAL | Age: 39
Discharge: HOME | End: 2024-12-23
Attending: FAMILY MEDICINE
Payer: COMMERCIAL

## 2024-12-23 VITALS
DIASTOLIC BLOOD PRESSURE: 73 MMHG | HEART RATE: 81 BPM | SYSTOLIC BLOOD PRESSURE: 150 MMHG | OXYGEN SATURATION: 98 % | TEMPERATURE: 97.5 F | RESPIRATION RATE: 18 BRPM

## 2024-12-23 DIAGNOSIS — D50.0 IRON DEFICIENCY ANEMIA DUE TO CHRONIC BLOOD LOSS: Primary | ICD-10-CM

## 2024-12-23 DIAGNOSIS — N92.4 EXCESSIVE BLEEDING IN PREMENOPAUSAL PERIOD: ICD-10-CM

## 2024-12-23 PROCEDURE — 76830 TRANSVAGINAL US NON-OB: CPT

## 2024-12-23 PROCEDURE — 76856 US EXAM PELVIC COMPLETE: CPT

## 2024-12-23 RX ORDER — SODIUM CHLORIDE 9 MG/ML
20 INJECTION, SOLUTION INTRAVENOUS ONCE
Status: COMPLETED | OUTPATIENT
Start: 2024-12-23 | End: 2024-12-23

## 2024-12-23 RX ORDER — SODIUM CHLORIDE 9 MG/ML
20 INJECTION, SOLUTION INTRAVENOUS ONCE
OUTPATIENT
Start: 2024-12-30

## 2024-12-23 RX ADMIN — SODIUM CHLORIDE 20 ML/HR: 0.9 INJECTION, SOLUTION INTRAVENOUS at 11:36

## 2024-12-23 RX ADMIN — IRON SUCROSE 100 MG: 20 INJECTION, SOLUTION INTRAVENOUS at 11:37

## 2024-12-23 NOTE — PROGRESS NOTES
Earnestine Mark  tolerated treatment well with no complications.      Earnestine Mark is aware of future appt on 12/30 at 1330.     AVS printed and given to Earnestine Mark:  Declined by Earnestine Mark

## 2024-12-30 ENCOUNTER — HOSPITAL ENCOUNTER (OUTPATIENT)
Dept: INFUSION CENTER | Facility: HOSPITAL | Age: 39
Discharge: HOME | End: 2024-12-30
Attending: FAMILY MEDICINE
Payer: COMMERCIAL

## 2024-12-30 VITALS
DIASTOLIC BLOOD PRESSURE: 70 MMHG | SYSTOLIC BLOOD PRESSURE: 123 MMHG | OXYGEN SATURATION: 98 % | TEMPERATURE: 97.3 F | RESPIRATION RATE: 18 BRPM | HEART RATE: 66 BPM

## 2024-12-30 DIAGNOSIS — N92.4 EXCESSIVE BLEEDING IN PREMENOPAUSAL PERIOD: ICD-10-CM

## 2024-12-30 DIAGNOSIS — D50.0 IRON DEFICIENCY ANEMIA DUE TO CHRONIC BLOOD LOSS: Primary | ICD-10-CM

## 2024-12-30 PROCEDURE — 96365 THER/PROPH/DIAG IV INF INIT: CPT

## 2024-12-30 RX ORDER — SODIUM CHLORIDE 9 MG/ML
20 INJECTION, SOLUTION INTRAVENOUS ONCE
Status: COMPLETED | OUTPATIENT
Start: 2024-12-30 | End: 2024-12-30

## 2024-12-30 RX ORDER — SODIUM CHLORIDE 9 MG/ML
20 INJECTION, SOLUTION INTRAVENOUS ONCE
Status: CANCELLED | OUTPATIENT
Start: 2025-01-08

## 2024-12-30 RX ADMIN — SODIUM CHLORIDE 20 ML/HR: 0.9 INJECTION, SOLUTION INTRAVENOUS at 13:59

## 2024-12-30 RX ADMIN — IRON SUCROSE 100 MG: 20 INJECTION, SOLUTION INTRAVENOUS at 14:04

## 2024-12-30 NOTE — PROGRESS NOTES
Earnestine Mark  tolerated venofer well with no complications. Aware of future appt on 1/8/25 at 1300. AVS printed. Patient left clinic ambulatory.

## 2025-01-03 ENCOUNTER — RESULTS FOLLOW-UP (OUTPATIENT)
Age: 40
End: 2025-01-03

## 2025-01-03 DIAGNOSIS — N92.4 EXCESSIVE BLEEDING IN PREMENOPAUSAL PERIOD: Primary | ICD-10-CM

## 2025-01-03 RX ORDER — MEDROXYPROGESTERONE ACETATE 10 MG
10 TABLET ORAL DAILY
Qty: 10 TABLET | Refills: 0 | Status: SHIPPED | OUTPATIENT
Start: 2025-01-03

## 2025-01-08 ENCOUNTER — HOSPITAL ENCOUNTER (OUTPATIENT)
Dept: INFUSION CENTER | Facility: HOSPITAL | Age: 40
Discharge: HOME | End: 2025-01-08
Attending: FAMILY MEDICINE
Payer: COMMERCIAL

## 2025-01-08 VITALS
HEART RATE: 68 BPM | OXYGEN SATURATION: 99 % | SYSTOLIC BLOOD PRESSURE: 141 MMHG | RESPIRATION RATE: 18 BRPM | DIASTOLIC BLOOD PRESSURE: 63 MMHG | TEMPERATURE: 97.3 F

## 2025-01-08 DIAGNOSIS — D50.0 IRON DEFICIENCY ANEMIA DUE TO CHRONIC BLOOD LOSS: Primary | ICD-10-CM

## 2025-01-08 DIAGNOSIS — N92.4 EXCESSIVE BLEEDING IN PREMENOPAUSAL PERIOD: ICD-10-CM

## 2025-01-08 PROCEDURE — 96365 THER/PROPH/DIAG IV INF INIT: CPT

## 2025-01-08 RX ORDER — SODIUM CHLORIDE 9 MG/ML
20 INJECTION, SOLUTION INTRAVENOUS ONCE
Status: CANCELLED | OUTPATIENT
Start: 2025-01-15

## 2025-01-08 RX ORDER — SODIUM CHLORIDE 9 MG/ML
20 INJECTION, SOLUTION INTRAVENOUS ONCE
Status: COMPLETED | OUTPATIENT
Start: 2025-01-08 | End: 2025-01-08

## 2025-01-08 RX ADMIN — IRON SUCROSE 100 MG: 20 INJECTION, SOLUTION INTRAVENOUS at 13:23

## 2025-01-08 RX ADMIN — SODIUM CHLORIDE 20 ML/HR: 9 INJECTION, SOLUTION INTRAVENOUS at 13:24

## 2025-01-08 NOTE — PLAN OF CARE
Problem: Potential for Falls  Goal: Patient will remain free of falls  Description: INTERVENTIONS:  - Educate patient/family on patient safety including physical limitations  - Instruct patient to call for assistance with activity   - Consult OT/PT to assist with strengthening/mobility   - Keep Call bell within reach  - Keep bed low and locked with side rails adjusted as appropriate  - Keep care items and personal belongings within reach  - Initiate and maintain comfort rounds  Outcome: Progressing     Problem: DISCHARGE PLANNING  Goal: Discharge to home or other facility with appropriate resources  Description: INTERVENTIONS:  - Identify barriers to discharge w/patient and caregiver  - Arrange for needed discharge resources and transportation as appropriate  - Identify discharge learning needs (meds, wound care, etc.)  - Arrange for interpretive services to assist at discharge as needed  - Refer to Case Management Department for coordinating discharge planning if the patient needs post-hospital services based on physician/advanced practitioner order or complex needs related to functional status, cognitive ability, or social support system  Outcome: Progressing     Problem: Knowledge Deficit  Goal: Patient/family/caregiver demonstrates understanding of disease process, treatment plan, medications, and discharge instructions  Description: Complete learning assessment and assess knowledge base.  Interventions:  - Provide teaching at level of understanding  - Provide teaching via preferred learning methods  Outcome: Progressing

## 2025-01-08 NOTE — PROGRESS NOTES
Earnestine Mark  tolerated treatment well with no complications.      Earnestine Mark is aware of future appt on 01/15 at 1400.     AVS printed and given to Earnestine Mark:  No (Declined by Earnestine Mark)

## 2025-01-15 ENCOUNTER — HOSPITAL ENCOUNTER (OUTPATIENT)
Dept: INFUSION CENTER | Facility: HOSPITAL | Age: 40
Discharge: HOME | End: 2025-01-15
Attending: FAMILY MEDICINE
Payer: COMMERCIAL

## 2025-01-15 VITALS
TEMPERATURE: 99 F | SYSTOLIC BLOOD PRESSURE: 142 MMHG | RESPIRATION RATE: 18 BRPM | DIASTOLIC BLOOD PRESSURE: 67 MMHG | OXYGEN SATURATION: 97 % | HEART RATE: 87 BPM

## 2025-01-15 DIAGNOSIS — N92.4 EXCESSIVE BLEEDING IN PREMENOPAUSAL PERIOD: ICD-10-CM

## 2025-01-15 DIAGNOSIS — D50.0 IRON DEFICIENCY ANEMIA DUE TO CHRONIC BLOOD LOSS: Primary | ICD-10-CM

## 2025-01-15 RX ORDER — SODIUM CHLORIDE 9 MG/ML
20 INJECTION, SOLUTION INTRAVENOUS ONCE
Status: COMPLETED | OUTPATIENT
Start: 2025-01-15 | End: 2025-01-15

## 2025-01-15 RX ORDER — SODIUM CHLORIDE 9 MG/ML
20 INJECTION, SOLUTION INTRAVENOUS ONCE
Status: CANCELLED | OUTPATIENT
Start: 2025-01-22

## 2025-01-15 RX ADMIN — IRON SUCROSE 100 MG: 20 INJECTION, SOLUTION INTRAVENOUS at 14:25

## 2025-01-15 RX ADMIN — SODIUM CHLORIDE 20 ML/HR: 9 INJECTION, SOLUTION INTRAVENOUS at 14:25

## 2025-01-15 NOTE — PROGRESS NOTES
Earnestine Mark  tolerated treatment well with no complications.      Earnestine Mark is aware of future appt on 01/22 at 1330.     AVS printed and given to Earnestine Mark:  No (Declined by Earnestine Mark)

## 2025-01-20 DIAGNOSIS — F32.A MODERATELY SEVERE DEPRESSION: ICD-10-CM

## 2025-01-20 RX ORDER — BUSPIRONE HYDROCHLORIDE 7.5 MG/1
7.5 TABLET ORAL 2 TIMES DAILY
Qty: 60 TABLET | Refills: 5 | Status: SHIPPED | OUTPATIENT
Start: 2025-01-20

## 2025-01-20 NOTE — TELEPHONE ENCOUNTER
VM on Rx line:    Good afternoon. This is Earnestine Mark. I'm calling to have my Buspirone filled 7.5 milligrams. My phone number is [phone] for [facility] in [place]. Thank you.

## 2025-01-22 ENCOUNTER — HOSPITAL ENCOUNTER (OUTPATIENT)
Dept: INFUSION CENTER | Facility: HOSPITAL | Age: 40
Discharge: HOME | End: 2025-01-22
Attending: FAMILY MEDICINE
Payer: COMMERCIAL

## 2025-01-22 DIAGNOSIS — D50.0 IRON DEFICIENCY ANEMIA DUE TO CHRONIC BLOOD LOSS: Primary | ICD-10-CM

## 2025-01-22 DIAGNOSIS — N92.4 EXCESSIVE BLEEDING IN PREMENOPAUSAL PERIOD: ICD-10-CM

## 2025-01-22 PROCEDURE — 96365 THER/PROPH/DIAG IV INF INIT: CPT

## 2025-01-22 RX ORDER — SODIUM CHLORIDE 9 MG/ML
20 INJECTION, SOLUTION INTRAVENOUS ONCE
Status: COMPLETED | OUTPATIENT
Start: 2025-01-22 | End: 2025-01-22

## 2025-01-22 RX ORDER — SODIUM CHLORIDE 9 MG/ML
20 INJECTION, SOLUTION INTRAVENOUS ONCE
Status: CANCELLED | OUTPATIENT
Start: 2025-01-22

## 2025-01-22 RX ADMIN — IRON SUCROSE 100 MG: 20 INJECTION, SOLUTION INTRAVENOUS at 13:49

## 2025-01-22 RX ADMIN — SODIUM CHLORIDE 20 ML/HR: 9 INJECTION, SOLUTION INTRAVENOUS at 13:49

## 2025-01-23 ENCOUNTER — PROCEDURE VISIT (OUTPATIENT)
Age: 40
End: 2025-01-23

## 2025-01-23 VITALS
SYSTOLIC BLOOD PRESSURE: 137 MMHG | HEART RATE: 69 BPM | TEMPERATURE: 97.7 F | OXYGEN SATURATION: 98 % | BODY MASS INDEX: 26.95 KG/M2 | WEIGHT: 157 LBS | DIASTOLIC BLOOD PRESSURE: 88 MMHG

## 2025-01-23 DIAGNOSIS — N92.4 EXCESSIVE BLEEDING IN PREMENOPAUSAL PERIOD: Primary | ICD-10-CM

## 2025-01-23 PROCEDURE — 99213 OFFICE O/P EST LOW 20 MIN: CPT | Performed by: OBSTETRICS & GYNECOLOGY

## 2025-01-23 NOTE — ASSESSMENT & PLAN NOTE
US pelvis 12/23 showed thickened endometrium measuring 20 mm however patient was about to begin menstrual cycle at the time of ultrasound  - Patient was started on Provera 10 mg daily, last dose 2 days prior to today  - On speculum exam, gross blood still seen, postponed endometrial biopsy     Plan:  - Return in 1 week for endometrial biopsy

## 2025-01-23 NOTE — PROGRESS NOTES
Name: Earnestine Mark      : 1985      MRN: [id number]  Encounter Provider: Vin Frankel MD  Encounter Date: 2025   Encounter department: [facility]  :  Assessment & Plan  Excessive bleeding in premenopausal period  US pelvis  showed thickened endometrium measuring 20 mm however patient was about to begin menstrual cycle at the time of ultrasound  - Patient was started on Provera 10 mg daily, last dose 2 days prior to today  - On speculum exam, gross blood still seen, postponed endometrial biopsy     Plan:  - Return in 1 week for endometrial biopsy          History of Present Illness {?Quick Links Encounters * My Last Note * Last Note in Specialty * Snapshot * Since Last Visit * History :57522}  Patient presents for possible endometrial biopsy procedure.  Reports that she took her Provera 2 days ago, still at the end of her period today.  Denies associated pain, nausea, vomiting.      Review of Systems   Constitutional:  Negative for fever.   Respiratory:  Negative for shortness of breath.    Genitourinary:  Positive for menstrual problem. Negative for dysuria and pelvic pain.   All other systems reviewed and are negative.      Objective {?Quick Links Trend Vitals * Enter New Vitals * Results Review * Timeline (Adult) * Labs * Imaging * Cardiology * Procedures * Lung Cancer Screening * Surgical eConsent :18396}  /88 (BP Location: Left arm, Patient Position: Sitting, Cuff Size: Large)   Pulse 69   Temp 97.7 °F (36.5 °C) (Tympanic)   Wt 71.2 kg (157 lb)   SpO2 98%   BMI 26.95 kg/m²      Physical Exam  Vitals reviewed. Exam conducted with a chaperone present.   Constitutional:       Appearance: Normal appearance.   Cardiovascular:      Rate and Rhythm: Normal rate.      Pulses: Normal pulses.   Pulmonary:      Effort: Pulmonary effort is normal. No respiratory distress.   Genitourinary:     Exam position: Lithotomy position.      Vagina: Normal. No  erythema.      Comments: Gross blood seen in vault on speculum exam  Neurological:      Mental Status: She is alert.   Psychiatric:         Mood and Affect: Mood normal.         Behavior: Behavior normal.

## 2025-01-28 DIAGNOSIS — G47.9 SLEEPING DIFFICULTY: ICD-10-CM

## 2025-01-28 RX ORDER — TRAZODONE HYDROCHLORIDE 50 MG/1
50 TABLET, FILM COATED ORAL
Qty: 90 TABLET | Refills: 1 | Status: SHIPPED | OUTPATIENT
Start: 2025-01-28

## 2025-01-30 ENCOUNTER — PROCEDURE VISIT (OUTPATIENT)
Age: 40
End: 2025-01-30

## 2025-01-30 VITALS
SYSTOLIC BLOOD PRESSURE: 145 MMHG | HEART RATE: 64 BPM | OXYGEN SATURATION: 99 % | BODY MASS INDEX: 27.59 KG/M2 | HEIGHT: 64 IN | DIASTOLIC BLOOD PRESSURE: 83 MMHG | RESPIRATION RATE: 18 BRPM | TEMPERATURE: 98 F | WEIGHT: 161.6 LBS

## 2025-01-30 DIAGNOSIS — N92.4 EXCESSIVE BLEEDING IN PREMENOPAUSAL PERIOD: Primary | ICD-10-CM

## 2025-01-30 PROCEDURE — 58100 BIOPSY OF UTERUS LINING: CPT | Performed by: OBSTETRICS & GYNECOLOGY

## 2025-01-30 PROCEDURE — 88305 TISSUE EXAM BY PATHOLOGIST: CPT | Performed by: STUDENT IN AN ORGANIZED HEALTH CARE EDUCATION/TRAINING PROGRAM

## 2025-01-30 NOTE — ASSESSMENT & PLAN NOTE
Notes significantly heavy periods  Can go through 4 pads per hour when it is heavy  Regular menstrual period(monthly)  Completed Venofer infusions  Patient was hesitant about IUD (Mirena)  Endometrial biopsy performed in office (cervical stenosis)  Orders:    Endometrial biopsy    Tissue Exam

## 2025-01-30 NOTE — PROGRESS NOTES
Name: Earnestine Mark      : 1985      MRN: [id number]  Encounter Provider: Carolee Courtney MD  Encounter Date: 2025   Encounter department: [facility]    Assessment & Plan  Excessive bleeding in premenopausal period  Notes significantly heavy periods  Can go through 4 pads per hour when it is heavy  Regular menstrual period(monthly)  Completed Venofer infusions  Patient was hesitant about IUD (Mirena)  Endometrial biopsy performed in office (cervical stenosis)  Orders:    Endometrial biopsy    Tissue Exam    Endometrial biopsy    Date/Time: 2025 8:40 AM    Performed by: Carolee Courtney MD  Authorized by: Carolee Courtney MD  Universal Protocol:  Procedure performed by: (Dr. Kingsley)  Consent: Verbal consent obtained. Written consent obtained.  Risks and benefits: risks, benefits and alternatives were discussed  Consent given by: patient  Timeout called at: 2025 8:40 AM.  Patient understanding: patient states understanding of the procedure being performed  Patient consent: the patient's understanding of the procedure matches consent given  Procedure consent: procedure consent matches procedure scheduled  Relevant documents: relevant documents present and verified  Test results: test results available and properly labeled  Site marked: the operative site was marked  Radiology Images displayed and confirmed. If images not available, report reviewed: imaging studies available  Required items: required blood products, implants, devices, and special equipment available  Patient identity confirmed: verbally with patient    Indication:     Indications: Other disorder of menstruation and other abnormal bleeding from female genital tract    Pre-procedure:     Premeds:  Naproxen sodium  Procedure:     Procedure: endometrial biopsy with Pipelle      A bivalve speculum was placed in the vagina: yes      Cervix cleaned and prepped: yes      A  paracervical block was performed: no      An intracervical block was performed: no      The cervix was dilated: no      Uterus sounded: yes      Uterus sound depth (cm):  9    Specimen collected: specimen collected and sent to pathology      Patient tolerated procedure well with no complications: yes    Findings:     Cervix: stenotic      Adnexa: normal             History of Present Illness        Review of Systems   Constitutional:  Negative for chills and fever.   HENT:  Negative for ear pain and sore throat.    Eyes:  Negative for pain and visual disturbance.   Respiratory:  Negative for cough and shortness of breath.    Cardiovascular:  Negative for chest pain and palpitations.   Gastrointestinal:  Negative for abdominal pain and vomiting.   Genitourinary:  Negative for dysuria and hematuria.   Musculoskeletal:  Negative for arthralgias and back pain.   Skin:  Negative for color change and rash.   Neurological:  Negative for seizures and syncope.   All other systems reviewed and are negative.    Objective   There were no vitals taken for this visit.      Physical Exam  Vitals and nursing note reviewed. Exam conducted with a chaperone present.   Constitutional:       General: She is not in acute distress.     Appearance: She is well-developed.   HENT:      Head: Normocephalic and atraumatic.      Right Ear: External ear normal.      Left Ear: External ear normal.      Nose: Nose normal.      Mouth/Throat:      Mouth: Mucous membranes are moist.   Eyes:      Extraocular Movements: Extraocular movements intact.      Conjunctiva/sclera: Conjunctivae normal.   Cardiovascular:      Rate and Rhythm: Normal rate and regular rhythm.      Pulses: Normal pulses.      Heart sounds: Normal heart sounds. No murmur heard.  Pulmonary:      Effort: Pulmonary effort is normal. No respiratory distress.      Breath sounds: Normal breath sounds.   Abdominal:      Palpations: Abdomen is soft.      Tenderness: There is no abdominal  tenderness. There is no right CVA tenderness or left CVA tenderness.   Genitourinary:     Comments: Cervical stenosis  Musculoskeletal:         General: No swelling.      Cervical back: Neck supple.   Skin:     General: Skin is warm and dry.      Capillary Refill: Capillary refill takes less than 2 seconds.   Neurological:      Mental Status: She is alert and oriented to person, place, and time.   Psychiatric:         Mood and Affect: Mood normal.         Behavior: Behavior normal.         Thought Content: Thought content normal.         Judgment: Judgment normal.

## 2025-02-04 PROCEDURE — 88305 TISSUE EXAM BY PATHOLOGIST: CPT | Performed by: STUDENT IN AN ORGANIZED HEALTH CARE EDUCATION/TRAINING PROGRAM

## 2025-02-06 ENCOUNTER — RESULTS FOLLOW-UP (OUTPATIENT)
Age: 40
End: 2025-02-06

## 2025-02-06 DIAGNOSIS — Z30.09 FAMILY PLANNING COUNSELING: Primary | ICD-10-CM

## 2025-02-06 DIAGNOSIS — D50.0 IRON DEFICIENCY ANEMIA DUE TO CHRONIC BLOOD LOSS: ICD-10-CM

## 2025-03-05 DIAGNOSIS — F32.A MODERATELY SEVERE DEPRESSION: ICD-10-CM

## 2025-03-05 RX ORDER — PAROXETINE 10 MG/1
20 TABLET, FILM COATED ORAL DAILY
Qty: 90 TABLET | Refills: 1 | Status: SHIPPED | OUTPATIENT
Start: 2025-03-05

## 2025-07-16 DIAGNOSIS — F32.A MODERATELY SEVERE DEPRESSION: ICD-10-CM

## 2025-07-17 RX ORDER — BUSPIRONE HYDROCHLORIDE 7.5 MG/1
7.5 TABLET ORAL 2 TIMES DAILY
Qty: 60 TABLET | Refills: 1 | Status: SHIPPED | OUTPATIENT
Start: 2025-07-17

## 2025-07-17 RX ORDER — PAROXETINE 10 MG/1
20 TABLET, FILM COATED ORAL DAILY
Qty: 90 TABLET | Refills: 1 | Status: SHIPPED | OUTPATIENT
Start: 2025-07-17